# Patient Record
Sex: FEMALE | Race: WHITE | NOT HISPANIC OR LATINO | Employment: FULL TIME | URBAN - METROPOLITAN AREA
[De-identification: names, ages, dates, MRNs, and addresses within clinical notes are randomized per-mention and may not be internally consistent; named-entity substitution may affect disease eponyms.]

---

## 2017-04-09 ENCOUNTER — OFFICE VISIT (OUTPATIENT)
Dept: URGENT CARE | Facility: URGENT CARE | Age: 52
End: 2017-04-09
Payer: COMMERCIAL

## 2017-04-09 VITALS
BODY MASS INDEX: 30.02 KG/M2 | HEIGHT: 72 IN | WEIGHT: 221.6 LBS | OXYGEN SATURATION: 97 % | TEMPERATURE: 98 F | DIASTOLIC BLOOD PRESSURE: 80 MMHG | SYSTOLIC BLOOD PRESSURE: 100 MMHG | HEART RATE: 70 BPM

## 2017-04-09 DIAGNOSIS — R05.8 PRODUCTIVE COUGH: ICD-10-CM

## 2017-04-09 DIAGNOSIS — J34.89 PURULENT NASAL DISCHARGE: Primary | ICD-10-CM

## 2017-04-09 DIAGNOSIS — R09.89 CHEST CONGESTION: ICD-10-CM

## 2017-04-09 PROCEDURE — 99214 OFFICE O/P EST MOD 30 MIN: CPT | Performed by: PHYSICIAN ASSISTANT

## 2017-04-09 RX ORDER — AZITHROMYCIN 250 MG/1
TABLET, FILM COATED ORAL
Qty: 6 TABLET | Refills: 0 | Status: SHIPPED | OUTPATIENT
Start: 2017-04-09 | End: 2017-04-25

## 2017-04-09 NOTE — PROGRESS NOTES
SUBJECTIVE:   Christiana Hill is a 51 year old female presenting with a chief complaint of nasal congestion, rhinorrhea, cough  and facial pain/pressure.  Onset of symptoms was 8 day(s) ago.  Course of illness is worsening.    Severity moderate  Current and Associated symptoms: chest congestion, productive cough, sinus congestion  Treatment measures tried include OTC meds.  Predisposing factors include recent illness.    Past Medical History:   Diagnosis Date     Cancer (H) 2007    colon     History of colon cancer, no staging 9/20/2013     History of sleep apnea 9/20/2013     ALPHONSO (obstructive sleep apnea) 9/20/2013     Status post tonsillectomy 9/20/2013     Status post uvulopalatopharyngoplasty 9/20/2013       ALLERGIES   No Known Allergies      Social History   Substance Use Topics     Smoking status: Never Smoker     Smokeless tobacco: Never Used     Alcohol use Yes      Comment: once a week       ROS:  CONSTITUTIONAL:NEGATIVE for fever, chills, change in weight  INTEGUMENTARY/SKIN: NEGATIVE for worrisome rashes, moles or lesions  ENT/MOUTH: POSITIVE for purulent nasal drainage, congestion  RESP:POSITIVE for cough-productive  CV: NEGATIVE for chest pain, palpitations or peripheral edema  GI: NEGATIVE for nausea, abdominal pain, heartburn, or change in bowel habits  MUSCULOSKELETAL: NEGATIVE for significant arthralgias or myalgia  NEURO: NEGATIVE for weakness, dizziness or paresthesias    OBJECTIVE  :/80 (BP Location: Left arm, Patient Position: Chair, Cuff Size: Adult Regular)  Pulse 70  Temp 98  F (36.7  C) (Oral)  Ht 6' (1.829 m)  Wt 221 lb 9.6 oz (100.5 kg)  SpO2 97%  BMI 30.05 kg/m2  GENERAL APPEARANCE: healthy, alert and no distress  EYES: EOMI,  PERRL, conjunctiva clear  HENT: TM's normal bilaterally, nasal turbinates erythematous, swollen, rhinorrhea purulent, frontal sinus tenderness  and maxillary sinus tenderness   NECK: supple, nontender, no lymphadenopathy  RESP: lungs clear to auscultation  - no rales, rhonchi or wheezes  CV: regular rates and rhythm, normal S1 S2, no murmur noted  NEURO: Normal strength and tone, sensory exam grossly normal,  normal speech and mentation  SKIN: no suspicious lesions or rashes    ASSESSMENT/PLAN:      ICD-10-CM    1. Purulent nasal discharge J34.89 azithromycin (ZITHROMAX) 250 MG tablet   2. Productive cough R05 azithromycin (ZITHROMAX) 250 MG tablet   3. Chest congestion R09.89 azithromycin (ZITHROMAX) 250 MG tablet       Follow up as needed

## 2017-04-09 NOTE — NURSING NOTE
Chief Complaint   Patient presents with     URI     body aches, cough, chills, sinus congetsion  8x days        Initial /80 (BP Location: Left arm, Patient Position: Chair, Cuff Size: Adult Regular)  Pulse 70  Temp 98  F (36.7  C) (Oral)  Ht 6' (1.829 m)  Wt 221 lb 9.6 oz (100.5 kg)  SpO2 97%  BMI 30.05 kg/m2 Estimated body mass index is 30.05 kg/(m^2) as calculated from the following:    Height as of this encounter: 6' (1.829 m).    Weight as of this encounter: 221 lb 9.6 oz (100.5 kg).  Medication Reconciliation: complete

## 2017-04-09 NOTE — MR AVS SNAPSHOT
"              After Visit Summary   2017    Christiana Hill    MRN: [id number]           Patient Information     Date Of Birth          1965        Visit Information        Provider Department      2017 10:00 AM Nestor Metz PA-C [facility]        Today's Diagnoses     Purulent nasal discharge    -  1    Productive cough        Chest congestion           Follow-ups after your visit        Who to contact     If you have questions or need follow up information about today's clinic visit or your schedule please contact [facility] directly at [phone].  Normal or non-critical lab and imaging results will be communicated to you by MyChart, letter or phone within 4 business days after the clinic has received the results. If you do not hear from us within 7 days, please contact the clinic through [employer] or phone. If you have a critical or abnormal lab result, we will notify you by phone as soon as possible.  Submit refill requests through [employer] or call your pharmacy and they will forward the refill request to us. Please allow 3 business days for your refill to be completed.          Additional Information About Your Visit        MyChart Information     [employer] lets you send messages to your doctor, view your test results, renew your prescriptions, schedule appointments and more. To sign up, go to [email or web address] . Click on \"Log in\" on the left side of the screen, which will take you to the Welcome page. Then click on \"Sign up Now\" on the right side of the page.     You will be asked to enter the access code listed below, as well as some personal information. Please follow the directions to create your username and password.     Your access code is: [email or web address]  Expires: 2017 10:50 AM     Your access code will  in 90 days. If you need help or a new code, please call your [facility] or [phone].        Care EveryWhere " ID     This is your Care EveryWhere ID. This could be used by other organizations to access your [facility] medical records  [id number]        Your Vitals Were     Pulse Temperature Height Pulse Oximetry BMI (Body Mass Index)       70 98  F (36.7  C) (Oral) 6' (1.829 m) 97% 30.05 kg/m2        Blood Pressure from Last 3 Encounters:   04/09/17 100/80   12/19/14 124/84   03/12/14 112/68    Weight from Last 3 Encounters:   04/09/17 221 lb 9.6 oz (100.5 kg)   12/19/14 214 lb (97.1 kg)   03/12/14 212 lb (96.2 kg)              Today, you had the following     No orders found for display         Today's Medication Changes          These changes are accurate as of: 4/9/17 10:50 AM.  If you have any questions, ask your nurse or doctor.               Start taking these medicines.        Dose/Directions    azithromycin 250 MG tablet   Commonly known as:  ZITHROMAX   Used for:  Purulent nasal discharge, Productive cough, Chest congestion   Started by:  Nestor Metz PA-C        2 tabs po qd day 1, then 1 tab po qd days 2-5   Quantity:  6 tablet   Refills:  0            Where to get your medicines      These medications were sent to [facility] [id number] [place] [address] [address] AT [facility]  [address], [facility] [address]     Phone:  [phone]     azithromycin 250 MG tablet                Primary Care Provider Office Phone # Fax #    Mara Rachel Rico PA-C [phone]       [address] [address]        Thank you!     Thank you for choosing [facility]  for your care. Our goal is always to provide you with excellent care. Hearing back from our patients is one way we can continue to improve our services. Please take a few minutes to complete the written survey that you may receive in the mail after your visit with us. Thank you!             Your Updated Medication List -  Protect others around you: Learn how to safely use, store and throw away your medicines at www.disposemymeds.org.          This list is accurate as of: 4/9/17 10:50 AM.  Always use your most recent med list.                   Brand Name Dispense Instructions for use    azithromycin 250 MG tablet    ZITHROMAX    6 tablet    2 tabs po qd day 1, then 1 tab po qd days 2-5       buPROPion 75 MG tablet    WELLBUTRIN    90 tablet    Take 1 tab daily       CALCIUM PO          CLARITIN PO          FISH OIL PO          ONE-A-DAY MENOPAUSE HEALTH PO

## 2017-04-24 ENCOUNTER — HOSPITAL ENCOUNTER (EMERGENCY)
Facility: CLINIC | Age: 52
Discharge: PSYCHIATRIC OR SUBSTANCE USE FACILITY | End: 2017-04-25
Attending: EMERGENCY MEDICINE | Admitting: EMERGENCY MEDICINE
Payer: COMMERCIAL

## 2017-04-24 DIAGNOSIS — F31.9 BIPOLAR DISORDER WITH PSYCHOTIC FEATURES (H): ICD-10-CM

## 2017-04-24 PROCEDURE — 99285 EMERGENCY DEPT VISIT HI MDM: CPT

## 2017-04-25 ENCOUNTER — HOSPITAL ENCOUNTER (INPATIENT)
Facility: CLINIC | Age: 52
LOS: 2 days | Discharge: HOME | DRG: 885 | End: 2017-04-27
Attending: PSYCHIATRY & NEUROLOGY | Admitting: PSYCHIATRY & NEUROLOGY
Payer: COMMERCIAL

## 2017-04-25 VITALS
DIASTOLIC BLOOD PRESSURE: 87 MMHG | HEART RATE: 86 BPM | HEIGHT: 72 IN | WEIGHT: 230 LBS | SYSTOLIC BLOOD PRESSURE: 132 MMHG | OXYGEN SATURATION: 100 % | TEMPERATURE: 98.7 F | RESPIRATION RATE: 16 BRPM | BODY MASS INDEX: 31.15 KG/M2

## 2017-04-25 DIAGNOSIS — F31.9 BIPOLAR I DISORDER (H): Primary | ICD-10-CM

## 2017-04-25 DIAGNOSIS — F51.01 PRIMARY INSOMNIA: ICD-10-CM

## 2017-04-25 PROBLEM — F30.9 MANIA (H): Status: ACTIVE | Noted: 2017-04-25

## 2017-04-25 LAB
ALBUMIN UR-MCNC: NEGATIVE MG/DL
AMPHETAMINES UR QL SCN: NORMAL
ANION GAP SERPL CALCULATED.3IONS-SCNC: 10 MMOL/L (ref 3–14)
APPEARANCE UR: CLEAR
BACTERIA #/AREA URNS HPF: ABNORMAL /HPF
BARBITURATES UR QL: NORMAL
BASOPHILS # BLD AUTO: 0 10E9/L (ref 0–0.2)
BASOPHILS NFR BLD AUTO: 0.3 %
BENZODIAZ UR QL: NORMAL
BILIRUB UR QL STRIP: NEGATIVE
BUN SERPL-MCNC: 11 MG/DL (ref 7–30)
CALCIUM SERPL-MCNC: 8.8 MG/DL (ref 8.5–10.1)
CANNABINOIDS UR QL SCN: NORMAL
CHLORIDE SERPL-SCNC: 106 MMOL/L (ref 94–109)
CO2 SERPL-SCNC: 23 MMOL/L (ref 20–32)
COCAINE UR QL: NORMAL
COLOR UR AUTO: ABNORMAL
CREAT SERPL-MCNC: 0.97 MG/DL (ref 0.52–1.04)
DIFFERENTIAL METHOD BLD: NORMAL
EOSINOPHIL # BLD AUTO: 0.1 10E9/L (ref 0–0.7)
EOSINOPHIL NFR BLD AUTO: 0.6 %
ERYTHROCYTE [DISTWIDTH] IN BLOOD BY AUTOMATED COUNT: 13.5 % (ref 10–15)
GFR SERPL CREATININE-BSD FRML MDRD: 60 ML/MIN/1.7M2
GLUCOSE SERPL-MCNC: 122 MG/DL (ref 70–99)
GLUCOSE UR STRIP-MCNC: NEGATIVE MG/DL
HCT VFR BLD AUTO: 39.9 % (ref 35–47)
HGB BLD-MCNC: 13.6 G/DL (ref 11.7–15.7)
HGB UR QL STRIP: NEGATIVE
IMM GRANULOCYTES # BLD: 0 10E9/L (ref 0–0.4)
IMM GRANULOCYTES NFR BLD: 0.1 %
KETONES UR STRIP-MCNC: 10 MG/DL
LEUKOCYTE ESTERASE UR QL STRIP: NEGATIVE
LYMPHOCYTES # BLD AUTO: 2.3 10E9/L (ref 0.8–5.3)
LYMPHOCYTES NFR BLD AUTO: 26.7 %
MCH RBC QN AUTO: 31.1 PG (ref 26.5–33)
MCHC RBC AUTO-ENTMCNC: 34.1 G/DL (ref 31.5–36.5)
MCV RBC AUTO: 91 FL (ref 78–100)
MONOCYTES # BLD AUTO: 0.9 10E9/L (ref 0–1.3)
MONOCYTES NFR BLD AUTO: 9.8 %
NEUTROPHILS # BLD AUTO: 5.4 10E9/L (ref 1.6–8.3)
NEUTROPHILS NFR BLD AUTO: 62.5 %
NITRATE UR QL: NEGATIVE
OPIATES UR QL SCN: NORMAL
PCP UR QL SCN: NORMAL
PH UR STRIP: 7 PH (ref 5–7)
PLATELET # BLD AUTO: 313 10E9/L (ref 150–450)
POTASSIUM SERPL-SCNC: 3.5 MMOL/L (ref 3.4–5.3)
RBC # BLD AUTO: 4.38 10E12/L (ref 3.8–5.2)
RBC #/AREA URNS AUTO: 1 /HPF (ref 0–2)
SODIUM SERPL-SCNC: 139 MMOL/L (ref 133–144)
SP GR UR STRIP: 1.01 (ref 1–1.03)
SQUAMOUS #/AREA URNS AUTO: <1 /HPF (ref 0–1)
TSH SERPL DL<=0.05 MIU/L-ACNC: 2.18 MU/L (ref 0.4–4)
URN SPEC COLLECT METH UR: ABNORMAL
UROBILINOGEN UR STRIP-MCNC: NORMAL MG/DL (ref 0–2)
WBC # BLD AUTO: 8.7 10E9/L (ref 4–11)
WBC #/AREA URNS AUTO: 1 /HPF (ref 0–2)

## 2017-04-25 PROCEDURE — 81001 URINALYSIS AUTO W/SCOPE: CPT | Performed by: EMERGENCY MEDICINE

## 2017-04-25 PROCEDURE — 12400001 ZZH R&B MH UMMC

## 2017-04-25 PROCEDURE — 84443 ASSAY THYROID STIM HORMONE: CPT | Performed by: EMERGENCY MEDICINE

## 2017-04-25 PROCEDURE — 80048 BASIC METABOLIC PNL TOTAL CA: CPT | Performed by: EMERGENCY MEDICINE

## 2017-04-25 PROCEDURE — 25000132 ZZH RX MED GY IP 250 OP 250 PS 637: Performed by: PSYCHIATRY & NEUROLOGY

## 2017-04-25 PROCEDURE — 85025 COMPLETE CBC W/AUTO DIFF WBC: CPT | Performed by: EMERGENCY MEDICINE

## 2017-04-25 PROCEDURE — 80307 DRUG TEST PRSMV CHEM ANLYZR: CPT | Performed by: EMERGENCY MEDICINE

## 2017-04-25 RX ORDER — BISACODYL 10 MG
10 SUPPOSITORY, RECTAL RECTAL DAILY PRN
Status: DISCONTINUED | OUTPATIENT
Start: 2017-04-25 | End: 2017-04-27 | Stop reason: HOSPADM

## 2017-04-25 RX ORDER — OLANZAPINE 10 MG/2ML
10 INJECTION, POWDER, FOR SOLUTION INTRAMUSCULAR
Status: DISCONTINUED | OUTPATIENT
Start: 2017-04-25 | End: 2017-04-27 | Stop reason: HOSPADM

## 2017-04-25 RX ORDER — OLANZAPINE 10 MG/1
10 TABLET ORAL
Status: DISCONTINUED | OUTPATIENT
Start: 2017-04-25 | End: 2017-04-27 | Stop reason: HOSPADM

## 2017-04-25 RX ORDER — TRAZODONE HYDROCHLORIDE 50 MG/1
50 TABLET, FILM COATED ORAL
Status: DISCONTINUED | OUTPATIENT
Start: 2017-04-25 | End: 2017-04-27 | Stop reason: HOSPADM

## 2017-04-25 RX ORDER — ALUMINA, MAGNESIA, AND SIMETHICONE 2400; 2400; 240 MG/30ML; MG/30ML; MG/30ML
30 SUSPENSION ORAL EVERY 4 HOURS PRN
Status: DISCONTINUED | OUTPATIENT
Start: 2017-04-25 | End: 2017-04-27 | Stop reason: HOSPADM

## 2017-04-25 RX ORDER — HYDROXYZINE HYDROCHLORIDE 25 MG/1
25-50 TABLET, FILM COATED ORAL EVERY 4 HOURS PRN
Status: DISCONTINUED | OUTPATIENT
Start: 2017-04-25 | End: 2017-04-27 | Stop reason: HOSPADM

## 2017-04-25 RX ORDER — ACETAMINOPHEN 325 MG/1
650 TABLET ORAL EVERY 4 HOURS PRN
Status: DISCONTINUED | OUTPATIENT
Start: 2017-04-25 | End: 2017-04-27 | Stop reason: HOSPADM

## 2017-04-25 RX ADMIN — TRAZODONE HYDROCHLORIDE 50 MG: 50 TABLET ORAL at 20:50

## 2017-04-25 RX ADMIN — OLANZAPINE 10 MG: 10 TABLET, FILM COATED ORAL at 19:46

## 2017-04-25 ASSESSMENT — ACTIVITIES OF DAILY LIVING (ADL)
DRESS: STREET CLOTHES;INDEPENDENT
TOILETING: 0-->INDEPENDENT
TRANSFERRING: 0-->INDEPENDENT
AMBULATION: 0-->INDEPENDENT
BATHING: 0-->INDEPENDENT
RETIRED_EATING: 0-->INDEPENDENT
DRESS: 0-->INDEPENDENT
SWALLOWING: 0-->SWALLOWS FOODS/LIQUIDS WITHOUT DIFFICULTY
RETIRED_COMMUNICATION: 0-->UNDERSTANDS/COMMUNICATES WITHOUT DIFFICULTY
GROOMING: HANDWASHING;INDEPENDENT
FALL_HISTORY_WITHIN_LAST_SIX_MONTHS: NO
COGNITION: 0 - NO COGNITION ISSUES REPORTED

## 2017-04-25 NOTE — PROGRESS NOTES
04/25/17 1212   Patient Belongings   Patient Belongings other (see comments)     Locker:  Earrings    Belongings brought for Pt on 4/26/17 @7pm-   2 pair of under wear, 2 pairs of bra, 2 pairs of T-shirt, 1 pair of night gown,  1 pair of 3/4-trouser (black),  3 pairs of socks,  1 pair of short pant with string,1 used colgate toothpaste, 1 tub of White& Clean, 1 Olay Night cream, 1 bottle of Olay sunscreen lotion, 1 degree Ultraclear antiperspirant, 1 hair guide,  1 Purple bag,  and 1 pair of shoes  **nothing sent to security**    ADMISSION:  I am responsible for any personal items that are not sent to the safe or pharmacy. [facility] is not responsible for loss, theft or damage of any property in my possession.  Patient Signature _____________________ Date/Time _____________________  Staff Signature _______________________ Date/Time _____________________  2nd Staff person, if patient is unable/unwilling to sign  ___________________________________ Date/Time _____________________  DISCHARGE:  All personal items have been returned to me.  Patient Signature _____________________ Date/Time _____________________  Staff Signature _______________________ Date/Time _____________________

## 2017-04-25 NOTE — ED NOTES
Patient given copy of 72 hour hold and patient rights.  Patient signed EMTALA.   notified of transfer via phone and given number to admitting unit.

## 2017-04-25 NOTE — IP AVS SNAPSHOT
30    [address]    [place] [address]    Phone:  [phone]                                       After Visit Summary   4/25/2017    Christiana Hill    MRN: [id number]           After Visit Summary Signature Page     I have received my discharge instructions, and my questions have been answered. I have discussed any challenges I see with this plan with the nurse or doctor.    ..........................................................................................................................................  Patient/Patient Representative Signature      ..........................................................................................................................................  Patient Representative Print Name and Relationship to Patient    ..................................................               ................................................  Date                                            Time    ..........................................................................................................................................  Reviewed by Signature/Title    ...................................................              ..............................................  Date                                                            Time

## 2017-04-25 NOTE — ED NOTES
Patient calm, cooperative and very pleasant. Sitting quietly in room. RN brought puzzle which patient is working on.

## 2017-04-25 NOTE — ED NOTES
Call light answered. Patient requesting to take cell phone out of the room so that she could sleep. ERT complied. Cell phone is out of the room and will be returned to the patients  when he returns in the morning.

## 2017-04-25 NOTE — ED PROVIDER NOTES
Assumed care at change of shift - patient accepted for admission at [facility].     Jacky Brar MD  04/25/17 7583

## 2017-04-25 NOTE — IP AVS SNAPSHOT
MRN:[id number]                      After Visit Summary   4/25/2017    Christiana Hill    MRN: [id number]           Thank you!     Thank you for choosing [facility] for your care. Our goal is always to provide you with excellent care.        Patient Information     Date Of Birth          1965        Designated Caregiver       Most Recent Value    Caregiver    Will someone help with your care after discharge? yes    Name of designated caregiver  Gwyn    Phone number of caregiver [phone]    Caregiver address [place]      About your hospital stay     You were admitted on:  April 25, 2017 You last received care in the:  UR 30NR    You were discharged on:  April 27, 2017       Who to Call     For medical emergencies, please call 911.  For non-urgent questions about your medical care, please call your primary care provider or clinic, [phone]          Attending Provider     Provider Celso Shipman MD Psychiatry       Primary Care Provider Office Phone # Fax #    Mara Rico PA-C [phone]       [address] [address]        Your next 10 appointments already scheduled     May 12, 2017  9:00 AM CDT   Office Visit with Mara Rico PA-C   [facility] ([facility])    [address] [place] [address] [phone]           Bring a current list of meds and any records pertaining to this visit.  For Physicals, please bring immunization records and any forms needing to be filled out.  Please arrive 10 minutes early to complete paperwork.              Further instructions from your care team           Behavioral Discharge Planning and Instructions      Summary:  You were admitted on 4/25/2017  for symptoms of roderick.  You were treated by Dr. Celso Bolivar MD. You agreed that you have Bipolar Disorder and  agreed that you should take  medications. You were discharged on 4/27/2017 from [facility].    Main Diagnosis:   Bipolar disorder type 1, most recent episode manic (provisional).     Health Care Follow-up Appointments:     Attend your new patient therapy appointment. Tuesday, May 9, 2017 at 3PM  Aspen Almanza  [facility]  [address]  [address]  [place] [address]  Phone: [phone]  The Health Unit Coordinator has faxed these discharge instructions to fax: [phone]      Attend your new patient psychiatric medication management appointment. Wednesday, June 14, 2017 at 9AM  Li Cook   [facility]  [address]  [address]  [place] [address]  Phone: [phone]  The Health Unit Coordinator has faxed these discharge instructions to fax: [phone]      Attend your primary care appointment. Have this provided order the refills for your psychiatric medication.  May 12, 2017  9:00 AM CDT   Office Visit with Mara Rico PA-C   [facility] ([facility]    [address] [place] [address] [phone]            Attend all scheduled appointments with your outpatient providers. Call at least 24 hours in advance if you need to reschedule an appointment to ensure continued access to your outpatient providers.   Major Treatments, Procedures and Findings:  You were provided with: a psychiatric assessment, medication evaluation and/or management and group therapy    Symptoms to Report: feeling more aggressive or Uatsdin speech out of context    Early warning signs can include: overspending    Safety and Wellness:  Take all medicines as directed.  Make no changes unless your doctor suggests them.      Follow treatment recommendations.  Refrain from alcohol and non-prescribed drugs.  If there is a concern for safety, call 911.    Resources:   COPE (Community Outreach for Psychiatric Emergencies): [phone].  Fax:  [phone]  Available 24-hours a day, 7 days a week. Call a COPE professional when a severe disturbance of mood or thinking is impacting your safety. COPE professionals are available to go where you are, handle an immediate crisis, and provide a clinical assessment. If needed, COPE will arrange an emergency evaluation for inpatient psychiatric services. Telephone consultations are also available. Ask them if you meet criteria for a crisis residence.     You can also talk to COPE about crisis stabilization services if you are experiencing difficulty with the transition from the hospital.      DBSA  Depression and Bipolar Support [place]   www.dbaalliance.org    We recommend you participate in DBSA groups (Depression & Bipolar Support [place]).  There is no charge for these support / education groups.    Warmline  If you are not in a crisis, but would like to receive support or information from a peer, contact the Warmline at [phone] ([facility] area) or [phone].    [facility] Acute Psychiatric Services  Acute Psychiatric Services/Crisis Intervention: [phone]  24-hour walk-in crisis intervention and treatment of behavioral emergencies    Located at:  o 0 [facility] -  in the Emergency room on the first floor of the [facility]  [address] [place] [address]  Phone: [phone]  This is a crisis residence where you can stay for a short time if you feel like you need to stabilize but do not need to go to the hospital.      Crisis Connection 24/7 Community Call Center: [phone]  Phone: [phone] outside the [facility] area: [phone]  www.crisis.org   Hours: 24 hours/day, 7 days/week  Services: Free and confidential telephone counseling provided by trained volunteers  supervised by professional staff. Early intervention and brief supportive counseling for  callers with issues of depression, stress and anxiety, domestic  "violence, parent/child  conflicts, family issues, loneliness, grief and loss, suicidal ideation and chronic mental  Illness.      [employer] [place] [facility]  You and your family would benefit from the support groups at [employer] [facility] [employer] [facility]- [place] Chapter.   Www.namihelps.org            The treatment team has appreciated the opportunity to work with you.     If you have any questions or concerns our unit number is [phone]  You may be receiving a follow-up phone call within the next three days from a representative from behavioral health.    You have identified the best phone number to reach you as [phone]          Pending Results     No orders found from 2017 to 2017.            Admission Information     Date & Time Provider Department Dept. Phone    2017 Celso Bolivar MD UR 30NR [phone]      Your Vitals Were     Temperature Respirations Height Weight BMI (Body Mass Index)       96.6  F (35.9  C) (Oral) 16 1.854 m (6' 1\") 99.3 kg (219 lb) 28.89 kg/m2       [employer] Information     [employer] lets you send messages to your doctor, view your test results, renew your prescriptions, schedule appointments and more. To sign up, go to [email or web address] . Click on \"Log in\" on the left side of the screen, which will take you to the Welcome page. Then click on \"Sign up Now\" on the right side of the page.     You will be asked to enter the access code listed below, as well as some personal information. Please follow the directions to create your username and password.     Your access code is: [email or web address]  Expires: 2017 10:50 AM     Your access code will  in 90 days. If you need help or a new code, please call your [facility] or [phone].        Care EveryWhere ID     This is your Care EveryWhere ID. This could be used by other organizations to access your [facility] medical records  [id number]           Review of your medicines      START " taking        Dose / Directions    ARIPiprazole 10 MG tablet   Commonly known as:  ABILIFY   Used for:  Bipolar I disorder (H)        Dose:  10 mg   Take 1 tablet (10 mg) by mouth At Bedtime   Quantity:  30 tablet   Refills:  0       traZODone 50 MG tablet   Commonly known as:  DESYREL   Used for:  Primary insomnia        Dose:  50 mg   Take 1 tablet (50 mg) by mouth nightly as needed for sleep   Quantity:  20 tablet   Refills:  0         CONTINUE these medicines which have NOT CHANGED        Dose / Directions    CALCIUM PO        Dose:  2 capsule   Take 2 capsules by mouth daily   Refills:  0       CLARITIN PO        Refills:  0       FISH OIL PO        Dose:  2 capsule   Take 2 capsules by mouth daily   Refills:  0       ONE-A-DAY MENOPAUSE HEALTH PO        Refills:  0            Where to get your medicines      These medications were sent to [facility] [place], [place] - [address]  [address] [address]     Phone:  [phone]     ARIPiprazole 10 MG tablet    traZODone 50 MG tablet                Protect others around you: Learn how to safely use, store and throw away your medicines at www.disposemymeds.org.             Medication List: This is a list of all your medications and when to take them. Check marks below indicate your daily home schedule. Keep this list as a reference.      Medications           Morning Afternoon Evening Bedtime As Needed    ARIPiprazole 10 MG tablet   Commonly known as:  ABILIFY   Take 1 tablet (10 mg) by mouth At Bedtime   Last time this was given:  5 mg on 4/26/2017  9:27 PM                                CALCIUM PO   Take 2 capsules by mouth daily                                CLARITIN PO                                FISH OIL PO   Take 2 capsules by mouth daily                                ONE-A-DAY MENOPAUSE HEALTH PO                                traZODone 50 MG tablet   Commonly known as:  DESYREL   Take 1 tablet (50 mg) by  mouth nightly as needed for sleep   Last time this was given:  50 mg on 4/26/2017  9:27 PM

## 2017-04-25 NOTE — PLAN OF CARE
Problem: Manic Symptoms  Intervention: Social and Therapeutic Interv (Manic Symptoms)        Occupational Therapy:  Pt has yet to attend OT-facilitated group sessions.   Plan:  Pt will be encouraged to attend groups and be provided a self assessment form.  OT staff will explain the value of OT including pt in her treatment plan and offer options to meet her needs and identified goals.

## 2017-04-25 NOTE — ED NOTES
"Patient was escorted to the bathroom to provide UA, patient walked into  The bathroom, stated \"done\" and walked out.  "

## 2017-04-25 NOTE — ED NOTES
"Patient has no history of mental illness, familial history.  Approximately 3 weeks prior, neighbor and  noted the patient to have different behaviors, not understanding personal space, now \"out of control\" spending of money, believes that she is God.   "
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
## 2017-04-25 NOTE — ED PROVIDER NOTES
"CHIEF COMPLAINT:  Psychotic break.      HISTORY OF PRESENT ILLNESS:  The patient Christiana Hill is a very unfortunate 51-year-old woman presenting to the Emergency Department with manic behaviors and psychotic findings with grandiose ideas that she is god.  The patient's neighbor is a psychologist and has a close relationship with the Liz family.  Approximately 3 weeks ago he noticed that the patient seemed to be invading his personal space when talking which was very unusual for her.  Furthermore her speech seemed to be rather pressured, and she seemed filled with energy.  She also made statements that seemed to be rather grandiose for her as she is typically a calm and humble woman.  Over these 3 weeks this behavior has escalated.  She is now purchasing all kinds of items from the [employer] internet site.  Her  says that she is lying to him, and when he confronts her she claims, \"I am Saturnino, so I can say no wrong.\"  This is very unusual behavior for the patient as she has no history of mental health problems.  However, there is a strong family history of bipolar disorder with her mother developing similar symptoms when she was middle-aged.  The patient has no complaints for me at all.  She is in excellent spirits and tells me that all is well and that we are all safe because god is with us all as the patient (god) is in the room.  She denies paranoia or hallucinations.      PAST MEDICAL HISTORY:  The patient has no significant past medical history.  She has chronic neck discomfort which is typically managed without pain medication.      MEDICATIONS:  Wellbutrin.      ALLERGIES:  None.      SOCIAL HISTORY:  The patient does not smoke or drink alcohol.  She denies using illicit drugs, and her family concurs with this.      REVIEW OF SYSTEMS:  Pertinent positives and negatives as above, all other systems reviewed and negative.      PHYSICAL EXAMINATION:   VITAL SIGNS:  Blood pressure 143/86, heart rate 76, " "respiratory rate 16, temperature 98.7 orally and satting 100% on room air.   GENERAL:  The patient is a very happy, bright, middle-aged woman resting comfortably in the bed and showing no signs of acute distress.   HEENT:  Pupils are equal, round, reactive to light with extraocular movements intact, no rhinorrhea, moist mucous membranes.   CARDIOVASCULAR:  Regular rate and rhythm, no murmurs, gallops or rubs.   RESPIRATORY:  Lungs clear to auscultation bilaterally without wheezes, rales or rhonchi.   GASTROINTESTINAL:  Positive bowel sounds, abdomen soft, nontender, nondistended.   MUSCULOSKELETAL:  Full range of motion of all extremities without difficulty.   SKIN:  Warm and dry without rash.   NEUROLOGIC:  Nonfocal examination.   PSYCHIATRIC:  The patient clearly is manic.  She has pressured speech.  However, she is very happy and almost giddy to be here with frequent laughter.  She states, \"Everything is great.  [employer] is great.  Life is great.  I could not be happier.\"  She frequently states that she is god.      LABORATORY RESULTS:     1.  Unremarkable chemistry panel.   2.  Normal CBC.   3.  Normal TSH.     4.  A urine toxicology screen is pending.   5.  A urinalysis is pending.      EMERGENCY DEPARTMENT COURSE AND MEDICAL DECISION MAKING:  Nursing notes were reviewed and agreed with.  The patient had an IV started, and labs were obtained.  She did not require any medications while in the Emergency Department.      Ms. Hill presents to us in a clearly manic state with psychotic features.  This appears to be a new psychotic break.  There is no evidence of an organic etiology.  I highly doubt this represents illicit drug use.  I spoke with Central Intake personally.  Unfortunately there are no beds available in the [facility].  Therefore the patient will be placed on Boarding Status.  She was placed on a Health Officer hold and will have a 72-hour hold placed once a bed is arranged.  The patient is " here voluntarily.  However, she has very poor insight into her medical and psychiatric issues and does not believe that she is actually ill.  Nonetheless she is very happy to stay, and her family is in agreement with her being admitted.  The patient will be signed out to the incoming Emergency Department Physician pending final disposition.      DIAGNOSIS:  Bipolar disorder with psychotic features, new onset.         CHAD A. TRIERWEILER, MD             D: 2017 03:37   T: 2017 08:47   MT: ROSAUAR#[id number]      Name:     KIM RAWLS   MRN:      [id number]        Account:      [id number]   :      1965           Visit Date:   2017      Document: [id number]       cc: Mara Rico PA-C

## 2017-04-25 NOTE — ED NOTES
Call light answered. Patient asking if we could provide her with a sleep number bed due to her sore hips. Patient pleasant to deal with at this time .

## 2017-04-26 PROCEDURE — 90853 GROUP PSYCHOTHERAPY: CPT

## 2017-04-26 PROCEDURE — 25000132 ZZH RX MED GY IP 250 OP 250 PS 637: Performed by: PSYCHIATRY & NEUROLOGY

## 2017-04-26 PROCEDURE — 12400001 ZZH R&B MH UMMC

## 2017-04-26 PROCEDURE — 99222 1ST HOSP IP/OBS MODERATE 55: CPT | Mod: AI | Performed by: PSYCHIATRY & NEUROLOGY

## 2017-04-26 RX ORDER — ARIPIPRAZOLE 5 MG/1
5 TABLET ORAL AT BEDTIME
Status: DISCONTINUED | OUTPATIENT
Start: 2017-04-26 | End: 2017-04-27 | Stop reason: HOSPADM

## 2017-04-26 RX ADMIN — TRAZODONE HYDROCHLORIDE 50 MG: 50 TABLET ORAL at 21:27

## 2017-04-26 RX ADMIN — ARIPIPRAZOLE 5 MG: 5 TABLET ORAL at 21:27

## 2017-04-26 ASSESSMENT — ACTIVITIES OF DAILY LIVING (ADL)
LAUNDRY: WITH SUPERVISION
DRESS: SCRUBS (BEHAVIORAL HEALTH)
ORAL_HYGIENE: INDEPENDENT
LAUNDRY: WITH SUPERVISION
DRESS: INDEPENDENT
HYGIENE/GROOMING: INDEPENDENT
GROOMING: INDEPENDENT
ORAL_HYGIENE: INDEPENDENT

## 2017-04-26 NOTE — PROGRESS NOTES
Initial Psychosocial Assessment    Information for assessment was obtained from:  I have reviewed the chart, met with the patient,     SRIRAM   Pt signed SRIRAM for  - Gwyn Byrd -   Neighbor - Wilian Onofre - [phone] - pt does not want to sign this,  wanted this signed      Presenting Problem:    This 41 year old female was brought to the [facility] ER on 17 by  and neighbor with roderick and psychosis with grandiose delusions. She has been spending excessively. This is a new onset of mental health disorder.     On 17 pt was transferred to [facility] via ambulance on a 72 hour hold.    Drug screen is negative.    There are guns in her home basement.    History of Mental Health and Chemical Dependency:    Pt s mental health symptoms only started a month ago. At first she was argumentative and then has become nonsensical.    Hospitalizations:   17 to present - [facility] [address] this is pt s first psychiatric hospitalization     Outpatient treatment  Has been in marriage therapy     Prior use of Psychotropic Medications:   Prescribed Wellbutrin by PCP    Substance abuse history  None noted    Family Description (Constellation, Family Psychiatric History):    Pt was born and raised in [place] [facility] [place] [facility]. Parents remained . Father  in . Growing up was stressful due to father s perfectionism. Pt has one older brother.    The pts mother had a psychotic break later in life. She reports her mother had some bad effects from taking lithium for BPAD.    Pt is  for the second time for 4 years. She does not have children.  has 3 adult children from a previous marriage.  is a .    Significant Life Events (Illness, Abuse, Trauma, Death):  Pt had cancer 15 years ago and is in remission.  Pt had a child  at the age of 9 months. He would be 17 years now.    Living Situation:  [place] [facility] [place] .    Educational  Background:  Pt has a Master s Degree.    Occupational History:  The pt is a school psychologist.    Financial Status:  Income:  employment wages  Insurance:  [employer] [place] [employer]age - her plan    Legal Issues:  None noted    Ethnic/Cultural Issues:  The patient does not identify any issues that impact treatment.    Spiritual Orientation:  The pt does not have a spiritual practice.     Service History:  None    Social Functioning (organization, interests):  Pt is a member of the [employer].    Current Treatment Providers are:    Marriage therapist  Dr. Jason Kearns in [facility] Assessment/Plan:  This is a new late in life onset of Bipolar DO.  Pt has her basic needs met.  Pt will need psychiatric care for the management of Bipolar DO.  Upon interview no psychosis is evident. Thoughts are linear and logical. Pt did have an explanation as to why she spoke in religous terms. Pt did take some zyprexa since she was placed on a hold.  Pt would like a therapist and psychiatrist. Pt will most likely return home tomorrow.

## 2017-04-26 NOTE — PLAN OF CARE
Problem: General Plan of Care (Inpatient Behavioral)  Goal: Team Discussion  Team Plan:   Outcome: No Change  BEHAVIORAL TEAM DISCUSSION     Continued Stay Criteria/Rationale:   Pt has severe symptoms of roderick with nonsensical speech and unable to function at home.     Plan:   Multidisciplinary evaluation  Medication management  Secure no access to the guns at home  Involve  in pt's care and discharge planning   refer to psychiatry for outpatient care     Participants:   Dr. Bolivar, Tatyana Quiñonez [facility] and Ruben Alex RN     Summary/Recommendation:   This 41 year old female was brought to the [facility] ER on 4/24/17 by  and neighbor with roderick and psychosis with grandiose delusions. She has been spending excessively. This is a new onset of mental health disorder.      On 4/25/17 pt was transferred to [facility] via ambulance on a 72 hour hold.     Drug screen is negative.     There are guns in her home basement.        Medical/Physical:   In remission from cancer        Progress:   Initial review
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
## 2017-04-26 NOTE — PLAN OF CARE
"Problem: Manic Symptoms  Goal: Manic Symptoms  Signs and symptoms of listed problems will be absent or manageable.   Outcome: No Change  Pt has been pleasant, very bright and cooperative this shift. She admits to some roderick which she correlates with menopause. Apparently this happened with pt's own mother. Pt denied ever being on estrogen or other hormonal replacement therapy for menopause. At the start of shift pt was bargaining for DC, and  was at entrance to possibly take her home. She signed an SRIRAM for him and both pt and spouse agreed that pt would stay here after some basic teaching and necessity of inpatient hospitalization was done.     Apparently this is pt's first psychiatric inpatient hospitalization. She is tangential and rambling during conversation, and could not answer many questions due to her manic state. She had some loose associations as she switched from topic to topic. She admits to recently buying multiple shoes, multiple oil filters and \"bulking up on stuff cause I don't like to shop.\" Her  reported that she bought a $900 smart phone. She did, however, request PRN as she felt that \"now is performance time- jazz hands!\" Pt claimed that she is part of an [employer] group and that she missed her recital tonight. She had an energized facial expression when talking about her leadership of the singing group. She also demonstrated a \"seven minute\" workout routine that she does on a daily basis. She expressed fear of needing to start Lithium, as her mother \"has been on it for years and now has brain damage.\" She participated well in group and is done with her intake packet. Will continue to monitor.      "

## 2017-04-26 NOTE — PROGRESS NOTES
Behavioral Health  Note   Behavioral Health  Spirituality Group Note     Unit 30    Name: Christiana Hill    YOB: 1965   MRN: [id number]    Age: 51 year old     Patient attended -led group, which included discussion of spirituality, coping with illness and building resilience.   Patient attended group for 0.5 hrs.   patient minimally participated, but was respectful to the group process.     [facility]  Staff    Page [phone]

## 2017-04-27 VITALS — HEIGHT: 72 IN | TEMPERATURE: 96.6 F | RESPIRATION RATE: 16 BRPM | WEIGHT: 219 LBS | BODY MASS INDEX: 29.66 KG/M2

## 2017-04-27 PROCEDURE — 90853 GROUP PSYCHOTHERAPY: CPT

## 2017-04-27 PROCEDURE — 99239 HOSP IP/OBS DSCHRG MGMT >30: CPT | Performed by: PSYCHIATRY & NEUROLOGY

## 2017-04-27 PROCEDURE — 25000132 ZZH RX MED GY IP 250 OP 250 PS 637: Performed by: PSYCHIATRY & NEUROLOGY

## 2017-04-27 PROCEDURE — 97150 GROUP THERAPEUTIC PROCEDURES: CPT | Mod: GO

## 2017-04-27 RX ORDER — TRAZODONE HYDROCHLORIDE 50 MG/1
50 TABLET, FILM COATED ORAL
Qty: 20 TABLET | Refills: 0 | Status: SHIPPED | OUTPATIENT
Start: 2017-04-27 | End: 2017-05-12

## 2017-04-27 RX ORDER — ARIPIPRAZOLE 10 MG/1
10 TABLET ORAL AT BEDTIME
Qty: 30 TABLET | Refills: 0 | Status: SHIPPED | OUTPATIENT
Start: 2017-04-27 | End: 2017-05-12

## 2017-04-27 RX ADMIN — ACETAMINOPHEN 650 MG: 325 TABLET, FILM COATED ORAL at 08:04

## 2017-04-27 ASSESSMENT — ACTIVITIES OF DAILY LIVING (ADL)
ORAL_HYGIENE: INDEPENDENT
DRESS: STREET CLOTHES;INDEPENDENT
GROOMING: HANDWASHING;SHOWER;INDEPENDENT
LAUNDRY: WITH SUPERVISION

## 2017-04-27 NOTE — PROGRESS NOTES
" Gwyn Hill ([phone]) (SRIRAM signed by Christiana on chart.)came to visit naz with Christiana. Visit lasted  15 to 20 minutes as they were argumentive with each other. Gwyn is upset that Christiana believes she is coming home tomorrow. He would like to speak with Dr. Bolivar prior to discharging patient. Patient has stated that \"She is staying with a friend upon discharge\" to him and is confused by this. Gwyn also finds Christiana to be \"non sensical and not any better than when she was admitted\". Gwyn Hill also requested that writer talk to a Wilian Onofre ([phone]), Wilian is a neighbor and psychologist by profession, (No SRIRAM).  Gwyn is disappointed that this writer not able to speak with Wilian Onofre as he is considered \"The behavioral health consultant to the family\" Gwyn was told that this writer would pass along his number to the physician Gwyn will be available after 10am tomorrow. One does get the impression that there is some marital discord with this couple.    "

## 2017-04-27 NOTE — PLAN OF CARE
Problem: Manic Symptoms  Intervention: Social and Therapeutic Interv (Manic Symptoms)        Occupational Therapy:  Pt. Attended a Mental Health Management Group and participated in a seated yoga session. A group discussion of exercise and the importance of relaxation followed. Pt.was cooperative and pleasant throughout session.

## 2017-04-27 NOTE — PLAN OF CARE
Problem: Manic Symptoms  Goal: Manic Symptoms  Signs and symptoms of listed problems will be absent or manageable.   Outcome: Adequate for Discharge Date Met:  04/27/17  Pt discharged @ 1455 with all belongings and 30 day supply of medications. Pt denies any suicidal thoughts. Pt is goal directed and organized. Pt appears calm. Speech normal rate and volume. Pt has been attending and participating in all groups. Pt admits to manic sx's. Pt agrees with plan to limit big decisions. Pt plans to stay with friends over the weekend. Pt was picked by friend who provided transportation. All discharge instructions were reviewed / aftercare appointments and medication instructions.     Problem: General Plan of Care (Inpatient Behavioral)  Goal: Individualization/Patient Specific Goal (IP Behavioral)  The patient and/or their representative will achieve their patient-specific goals related to the plan of care.    The patient-specific goals include:   Outcome: Adequate for Discharge Date Met:  04/27/17  Illness Management Recovery model:  Feedback.     Patient identified the following people they would like to receive feedback from if/when they see early warning signs:     Friend(s)friends, choir friends     Family(s):      Partner/Spouse: , but not feeling his support now.     Support Group Member(s):      Co-Worker(s):
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
## 2017-04-27 NOTE — DISCHARGE INSTRUCTIONS
Behavioral Discharge Planning and Instructions      Summary:  You were admitted on 4/25/2017  for symptoms of roderick.  You were treated by Dr. Celso Bolivar MD. You agreed that you have Bipolar Disorder and  agreed that you should take medications. You were discharged on 4/27/2017 from [facility].    Main Diagnosis:   Bipolar disorder type 1, most recent episode manic (provisional).     Health Care Follow-up Appointments:     Attend your new patient therapy appointment. Tuesday, May 9, 2017 at 3PM  Asp[facility] Almanza  [place] [address] [address] [address]  Phone: [phone]  The Health Unit Coordinator has faxed these discharge instructions to fax: [phone]      Attend your new patient psychiatric medication management appointment. Wednesday, June 14, 2017 at 9AM  Li Cook   [facility]  [address] [address]  Phone: [phone]  The Health Unit Coordinator has faxed these discharge instructions to fax: [phone]      Attend your primary care appointment. Have this provided order the refills for your psychiatric medication.  May 12, 2017  9:00 AM CDT   [facility]-C   [facility]    [address] [place] [id number] [phone]            Attend all scheduled appointments with your outpatient providers. Call at least 24 hours in advance if you need to reschedule an appointment to ensure continued access to your outpatient providers.   Major Treatments, Procedures and Findings:  You were provided with: a psychiatric assessment, medication evaluation and/or management and group therapy    Symptoms to Report: feeling more aggressive or [employer] speech out of context    Early warning signs can include: overspending    Safety and Wellness:  Take all medicines as directed.  Make no changes unless your doctor suggests them.       Follow treatment recommendations.  Refrain from alcohol and non-prescribed drugs.  If there is a concern for safety, call 911.    Resources:   COPE (Community Outreach for Psychiatric Emergencies): [phone].  Fax: [phone]  Available 24-hours a day, 7 days a week. Call a COPE professional when a severe disturbance of mood or thinking is impacting your safety. COPE professionals are available to go where you are, handle an immediate crisis, and provide a clinical assessment. If needed, COPE will arrange an emergency evaluation for inpatient psychiatric services. Telephone consultations are also available. Ask them if you meet criteria for a crisis residence.     You can also talk to COPE about crisis stabilization services if you are experiencing difficulty with the transition from the hospital.      [facility]  Depression and Bipolar [facility]   www.dbaalliance.org    We recommend you participate in [facility]BS[facility] groups (Depression & Bipolar Support [facility]).  There is no charge for these support / education groups.    Warmline  If you are not in a crisis, but would like to receive support or information from a peer, contact the Warmline at [phone] ([facility] area) or [phone].    [facility] Acute Psychiatric Services  Acute Psychiatric Services/Crisis Intervention: [phone]  24-hour walk-in crisis intervention and treatment of behavioral emergencies    Located at:   [address] [facility] -  in the Emergency room on the first floor of the [facility]  [facility] [place] [address]  Phone: [phone]  This is a crisis residence where you can stay for a short time if you feel like you need to stabilize but do not need to go to the hospital.      [facility]is [facility] 24/7 Community Call Center: [phone]  Phone: [phone] outside the [facility] area: [phone]  www.crisis.org   Hours: 24 hours/day, 7 days/week  Services: Free and  confidential telephone counseling provided by trained volunteers  supervised by professional staff. Early intervention and brief supportive counseling for  callers with issues of depression, stress and anxiety, domestic violence, parent/child  conflicts, family issues, loneliness, grief and loss, suicidal ideation and chronic mental  Illness.      [facility]  You and your family would benefit from the support groups at [facility].   Www.namihelps.org            The treatment team has appreciated the opportunity to work with you.     If you have any questions or concerns our unit number is [phone]  You may be receiving a follow-up phone call within the next three days from a representative from behavioral health.    You have identified the best phone number to reach you as [phone]

## 2017-04-27 NOTE — DISCHARGE SUMMARY
General acute hospital  Department of Psychiatry    DATE OF ADMISSION:  4/25/2017    DATE OF DISCHARGE:  April 27, 2017    DISCHARGE DIAGNOSES:   Bipolar disorder type 1, most recent episode manic (provisional).     HOSPITAL COURSE: (Refer to H&P, progress notes, and consult notes for details)    The patient was admitted to our Behavioral Health Unit under a 72 hour hold for reports of roderick and psychosis prompting some concern for her ability to adequately function. On initial examination, she did not meet criteria to pursue commitment for treatment of mental illness however was agreeable to continue treatment voluntarily. After reviewing her diagnosis and recommended treatment interventions, she was agreeable to start Abilify for mood stabilization and minimize reoccurrence of psychosis. She tolerated the medication well which was titrated up to 10 mg daily on the day of discharge. She had also utilized trazodone to help maintain good sleep. By my final examination with the patient, she was sleeping well, eating her meals, attending groups, appropriately social with peers and staff. She was not hostile or aggressive towards herself or others. She continued to deny suicidal and homicidal thoughts. She did not display any evidence of psychosis during her time on our unit. The patient requested to be discharged from the hospital and was willing to continue outpatient care with a community provider. Since she maintained the right to request discharge from the hospital, her request was granted and her care was transitioned to outpatient providers. She was agreeable for me to contact her  which I did on the day of discharge to review the patient's plan of care and answer corresponding questions.    Mood and anxiety-related symptoms had improved by the time of discharge and the patient denied suicidal and homicidal thoughts, plans, or intent.  Treatment team felt the patient was stable  and ready for discharge.    No restraints or seclusions were required during their hospitalization.  No allergies or severe adverse reactions to the medications were noted.    Other interventions received during his hospitalization included:   Psychosocial treatments were addressed with groups, social work consult, and supportive milieu provided by staff.    CONDITION AT DISCHARGE:  Improved.  The patients acute suicide risk is low due to the following factors:  improved mood/anxiety symptoms.  Denies suicidal ideations. Denies psychotic symptoms.  Not actively intoxicated and plans to abstain from illicit substances and alcohol.  Denies access to guns noting that guns which were previously available at her home had been secured from immediate access.  Denies feeling hopeless or helpless. At the time of discharge Christiana Hill was determined to not be an immediate danger to herself or others. The patient's acute risk will be higher if noncompliant with treatment plan, medications, follow-up or using illicit substances or alcohol.  These findings along with the risks of noncompliance with medications and treatment plan, which could potentially cause decompensation and increase the risk for suicide, were discussed with the patient.  The patients chronic suicide risk is moderate given the following factors: white race; diagnosis of Bipolar disorder,Denied a family history of suicide.  Preventative factors include: social supports, stable housing, employment     MENTAL STATUS EXAMINATION AT TIME OF DISCHARGE:  The patient is 51 year old White female who appears their stated age and is appropriately dressed with good hygiene.  Calm and cooperative with the interview questions.  No psychomotor abnormalities are noted. Eye contact is appropriate. Speech has normal rate, tone, latency and volume and is not pushed or pressured. Mood is euthymic and affect is full and appropriate.  The patient does not seem overtly depressed,  anxious, manic or irritable.  Thought process is linear, logical and future oriented.  Thought process is not tangential, circumstantial or disorganized.  Thought content is not significant for apperant paranoia, delusions, ideas of reference or grandiosity.  The patient denies suicidal and homicidal ideations as well as auditory and visual hallucinations.  Insight and judgment are fair.  Cognition appears intact to interviewing including orientation, recent and remote memory, fund of knowledge, use of language, attention span and concentration.  Muscle strength, tone and gait appear normal on visual inspection.      DISPOSITION:  The patient is discharged home     FOLLOWUP APPOINTMENTS:  ( per social workers notes and after visit summary)  1.  Psychotherapy through [facility] on May 9  2. Medication management through [facility] on June 14  3. Primary care appointment through [facility] in [place] May 12    DISCHARGE MEDICATIONS:   Current Discharge Medication List      START taking these medications    Details   ARIPiprazole (ABILIFY) 10 MG tablet Take 1 tablet (10 mg) by mouth At Bedtime  Qty: 30 tablet, Refills: 0    Associated Diagnoses: Bipolar I disorder (H)      traZODone (DESYREL) 50 MG tablet Take 1 tablet (50 mg) by mouth nightly as needed for sleep  Qty: 20 tablet, Refills: 0    Associated Diagnoses: Primary insomnia         CONTINUE these medications which have NOT CHANGED    Details   Omega-3 Fatty Acids (FISH OIL PO) Take 2 capsules by mouth daily       CALCIUM PO Take 2 capsules by mouth daily       Loratadine (CLARITIN PO)       Specialty Vitamins Products (ONE-A-DAY MENOPAUSE HEALTH PO)               LABORATORY RESULTS: (past 14 days)  Recent Results (from the past 336 hour(s))   CBC with platelets differential    Collection Time: 04/25/17 12:25 AM   Result Value Ref Range    WBC 8.7 4.0 - 11.0 10e9/L    RBC Count 4.38 3.8 - 5.2 10e12/L    Hemoglobin 13.6 11.7 -  15.7 g/dL    Hematocrit 39.9 35.0 - 47.0 %    MCV 91 78 - 100 fl    MCH 31.1 26.5 - 33.0 pg    MCHC 34.1 31.5 - 36.5 g/dL    RDW 13.5 10.0 - 15.0 %    Platelet Count 313 150 - 450 10e9/L    Diff Method Automated Method     % Neutrophils 62.5 %    % Lymphocytes 26.7 %    % Monocytes 9.8 %    % Eosinophils 0.6 %    % Basophils 0.3 %    % Immature Granulocytes 0.1 %    Absolute Neutrophil 5.4 1.6 - 8.3 10e9/L    Absolute Lymphocytes 2.3 0.8 - 5.3 10e9/L    Absolute Monocytes 0.9 0.0 - 1.3 10e9/L    Absolute Eosinophils 0.1 0.0 - 0.7 10e9/L    Absolute Basophils 0.0 0.0 - 0.2 10e9/L    Abs Immature Granulocytes 0.0 0 - 0.4 10e9/L   Basic metabolic panel    Collection Time: 04/25/17 12:25 AM   Result Value Ref Range    Sodium 139 133 - 144 mmol/L    Potassium 3.5 3.4 - 5.3 mmol/L    Chloride 106 94 - 109 mmol/L    Carbon Dioxide 23 20 - 32 mmol/L    Anion Gap 10 3 - 14 mmol/L    Glucose 122 (H) 70 - 99 mg/dL    Urea Nitrogen 11 7 - 30 mg/dL    Creatinine 0.97 0.52 - 1.04 mg/dL    GFR Estimate 60 (L) >60 mL/min/1.7m2    GFR Estimate If Black 73 >60 mL/min/1.7m2    Calcium 8.8 8.5 - 10.1 mg/dL   TSH    Collection Time: 04/25/17 12:25 AM   Result Value Ref Range    TSH 2.18 0.40 - 4.00 mU/L   UA with Microscopic    Collection Time: 04/25/17  6:03 AM   Result Value Ref Range    Color Urine Light Yellow     Appearance Urine Clear     Glucose Urine Negative NEG mg/dL    Bilirubin Urine Negative NEG    Ketones Urine 10 (A) NEG mg/dL    Specific Gravity Urine 1.006 1.003 - 1.035    Blood Urine Negative NEG    pH Urine 7.0 5.0 - 7.0 pH    Protein Albumin Urine Negative NEG mg/dL    Urobilinogen mg/dL Normal 0.0 - 2.0 mg/dL    Nitrite Urine Negative NEG    Leukocyte Esterase Urine Negative NEG    Source Midstream Urine     WBC Urine 1 0 - 2 /HPF    RBC Urine 1 0 - 2 /HPF    Bacteria Urine Moderate (A) NEG /HPF    Squamous Epithelial /HPF Urine <1 0 - 1 /HPF   Drug abuse screen urine    Collection Time: 04/25/17  6:03 AM   Result  Value Ref Range    Amphetamine Qual Urine  NEG     Negative   Cutoff for a negative amphetamine is 500 ng/mL or less.      Barbiturates Qual Urine  NEG     Negative   Cutoff for a negative barbiturate is 200 ng/mL or less.      Benzodiazepine Qual Urine  NEG     Negative   Cutoff for a negative benzodiazepine is 200 ng/mL or less.      Cannabinoids Qual Urine  NEG     Negative   Cutoff for a negative cannabinoid is 50 ng/mL or less.      Cocaine Qual Urine  NEG     Negative   Cutoff for a negative cocaine is 300 ng/mL or less.      Opiates Qualitative Urine  NEG     Negative   Cutoff for a negative opiate is 300 ng/mL or less.      PCP Qual Urine  NEG     Negative   Cutoff for a negative PCP is 25 ng/mL or less.         >30 minutes was spent on this discharge to allow for reviewing the patient's response to treatment, reviewing plan of care, education on medications and diagnosis, and conducting a risk assessment.
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
## 2017-04-28 ENCOUNTER — TELEPHONE (OUTPATIENT)
Dept: FAMILY MEDICINE | Facility: CLINIC | Age: 52
End: 2017-04-28

## 2017-04-28 NOTE — TELEPHONE ENCOUNTER
"Hospital/TCU/ED for chronic condition Discharge Protocol    \"Hi, my name is Norma BELLA,  a registered nurse, and I am calling from [facility].  I am calling to follow up and see how things are going for you after your recent emergency visit/hospital/TCU stay.\"    Tell me how you are doing now that you are home?\" \"just fine\"      Discharge Instructions    \"Let's review your discharge instructions.  What is/are the follow-up recommendations?  Pt. Response: take meds, use coping strategies    \"Has an appointment with your primary care provider been scheduled?\"   Yes. (confirm)    \"When you see the provider, I would recommend that you bring your medications with you.\"    Medications    \"Tell me what changed about your medicines when you discharged?\"    Changes to chronic meds?    No    \"What questions do you have about your medications?\"    None     New diagnoses of heart failure, COPD, diabetes, or MI?    No              Medication reconciliation completed? Yes  Was MTM referral placed (*Make sure to put transitions as reason for referral)?   No    Call Summary    \"What questions or concerns do you have about your recent visit and your follow-up care?\"     none    \"If you have questions or things don't continue to improve, we encourage you contact us through the main clinic number (give number).  Even if the clinic is not open, triage nurses are available 24/7 to help you.     We would like you to know that our clinic has extended hours (provide information).  We also have urgent care (provide details on closest location and hours/contact info)\"      \"Thank you for your time and take care!\"  Nomra Franco RN, BSN  Message handled by Nurse Triage.               "

## 2017-04-28 NOTE — TELEPHONE ENCOUNTER
ED / Discharge Outreach Protocol    Patient Contact    Attempt # 1    Was call answered?  No.  Left message on voicemail with information to call me back.  Ulisses Sunshine RN

## 2017-05-12 ENCOUNTER — OFFICE VISIT (OUTPATIENT)
Dept: FAMILY MEDICINE | Facility: CLINIC | Age: 52
End: 2017-05-12
Payer: COMMERCIAL

## 2017-05-12 VITALS
DIASTOLIC BLOOD PRESSURE: 72 MMHG | OXYGEN SATURATION: 98 % | HEART RATE: 75 BPM | TEMPERATURE: 98.5 F | RESPIRATION RATE: 16 BRPM | WEIGHT: 215 LBS | HEIGHT: 72 IN | BODY MASS INDEX: 29.12 KG/M2 | SYSTOLIC BLOOD PRESSURE: 110 MMHG

## 2017-05-12 DIAGNOSIS — N95.1 SYMPTOMATIC MENOPAUSAL OR FEMALE CLIMACTERIC STATES: ICD-10-CM

## 2017-05-12 DIAGNOSIS — F31.9 BIPOLAR 1 DISORDER (H): Primary | ICD-10-CM

## 2017-05-12 PROCEDURE — 99214 OFFICE O/P EST MOD 30 MIN: CPT | Performed by: PHYSICIAN ASSISTANT

## 2017-05-12 NOTE — MR AVS SNAPSHOT
After Visit Summary   5/12/2017    Christiana Hill    MRN: [id number]           Patient Information     Date Of Birth          1965        Visit Information        Provider Department      5/12/2017 9:00 AM Mara Rico PA-C [facility]        Today's Diagnoses     Bipolar 1 disorder (H)    -  1    Symptomatic menopausal or female climacteric states           Follow-ups after your visit        Additional Services     OB/GYN REFERRAL       Your provider has referred you to:  FMG: [facility] [phone]   [email or web address]  FHN: [facility] JOCE Rogers [phone]   [email or web address]    Please be aware that coverage of these services is subject to the terms and limitations of your health insurance plan.  Call member services at your health plan with any benefit or coverage questions.      Please bring the following with you to your appointment:    (1) Any X-Rays, CTs or MRIs which have been performed.  Contact the facility where they were done to arrange for  prior to your scheduled appointment.   (2) List of current medications   (3) This referral request   (4) Any documents/labs given to you for this referral                  Who to contact     If you have questions or need follow up information about today's clinic visit or your schedule please contact [facility] directly at [phone].  Normal or non-critical lab and imaging results will be communicated to you by MyChart, letter or phone within 4 business days after the clinic has received the results. If you do not hear from us within 7 days, please contact the clinic through MyChart or phone. If you have a critical or abnormal lab result, we will notify you by phone as soon as possible.  Submit refill requests through [employer] or call your pharmacy and they will forward the refill request to us. Please  "allow 3 business days for your refill to be completed.          Additional Information About Your Visit        MyChart Information     Oculevehart lets you send messages to your doctor, view your test results, renew your prescriptions, schedule appointments and more. To sign up, go to [email or web address] . Click on \"Log in\" on the left side of the screen, which will take you to the Welcome page. Then click on \"Sign up Now\" on the right side of the page.     You will be asked to enter the access code listed below, as well as some personal information. Please follow the directions to create your username and password.     Your access code is: [email or web address]  Expires: 2017 10:50 AM     Your access code will  in 90 days. If you need help or a new code, please call your [place] clinic or [phone].        Care EveryWhere ID     This is your [facility] EveryWhere ID. This could be used by other organizations to access your [place] medical records  [id number]        Your Vitals Were     Pulse Temperature Respirations Height Pulse Oximetry Breastfeeding?    75 98.5  F (36.9  C) (Oral) 16 6' (1.829 m) 98% No    BMI (Body Mass Index)                   29.16 kg/m2            Blood Pressure from Last 3 Encounters:   17 110/72   17 132/87   17 100/80    Weight from Last 3 Encounters:   17 215 lb (97.5 kg)   17 219 lb (99.3 kg)   17 230 lb (104.3 kg)              We Performed the Following     OB/GYN REFERRAL          Today's Medication Changes          These changes are accurate as of: 17  9:27 AM.  If you have any questions, ask your nurse or doctor.               Stop taking these medicines if you haven't already. Please contact your care team if you have questions.     ARIPiprazole 10 MG tablet   Commonly known as:  ABILIFY   Stopped by:  Mara Rico PA-C           CLARITIN PO   Stopped by:  Mara Rico PA-C           traZODone 50 MG tablet   Commonly " known as:  CARLY   Stopped by:  Mara Rico PA-C                    Primary Care Provider Office Phone # Fax #    Mara Rico PA-C [phone]       [address] [facility] [address]        Thank you!     Thank you for choosing [facility]  for your care. Our goal is always to provide you with excellent care. Hearing back from our patients is one way we can continue to improve our services. Please take a few minutes to complete the written survey that you may receive in the mail after your visit with us. Thank you!             Your Updated Medication List - Protect others around you: Learn how to safely use, store and throw away your medicines at www.disposemymeds.org.          This list is accurate as of: 5/12/17  9:27 AM.  Always use your most recent med list.                   Brand Name Dispense Instructions for use    BIOTIN PO          CALCIUM PO      Take 2 capsules by mouth daily       FISH OIL PO      Take 2 capsules by mouth daily       ONE-A-DAY MENOPAUSE HEALTH PO

## 2017-05-12 NOTE — NURSING NOTE
Chief Complaint   Patient presents with     Er F/u       Initial /72 (BP Location: Left arm, Patient Position: Chair, Cuff Size: Adult Large)  Pulse 75  Temp 98.5  F (36.9  C) (Oral)  Resp 16  Ht 6' (1.829 m)  Wt 215 lb (97.5 kg)  SpO2 98%  Breastfeeding? No  BMI 29.16 kg/m2 Estimated body mass index is 29.16 kg/(m^2) as calculated from the following:    Height as of this encounter: 6' (1.829 m).    Weight as of this encounter: 215 lb (97.5 kg).  Medication Reconciliation: complete Selene Dent MA  Health Maintenance has been reviewed.

## 2017-05-12 NOTE — PROGRESS NOTES
"    SUBJECTIVE:                                                    Christiana Hill is a 51 year old female who presents to clinic today for the following health issues:          Hospital Follow-up Visit:    Hospital/Nursing Home/ Rehab Facility: [facility]  Date of Admission: 4/25/17  Date of Discharge: 4/27/17  Reason(s) for Admission: bipolar 1, psychosis, insomnai          FOLLOWUP APPOINTMENTS: ( per social workers notes and after visit summary)  1. Psychotherapy through [facility] on May 9  2. Medication management through [facility] on June 14  3. Primary care appointment through [facility] in [place] May 12           Problems taking medications regularly:  No stopped all medications       Medication changes since discharge: Patient stopped all meds       Problems adhering to non-medication therapy:  None  Went to [facility] for therapy appointment 2 days ago  Summary of hospitalization:  [facility] discharge summary reviewed  Diagnostic Tests/Treatments reviewed.  Follow up needed: psychiatry, psychology  Other Healthcare Providers Involved in Patient s Care:         None  Update since discharge: improved.     Post Discharge Medication Reconciliation: discharge medications reconciled, continue medications without change.  Plan of care communicated with patient     Coding guidelines for this visit:  Type of Medical   Decision Making Face-to-Face Visit       within 7 Days of discharge Face-to-Face Visit        within 14 days of discharge   Moderate Complexity 72059 [id number]   High Complexity 68003 49643          Patient states she \"just didn't really sleep for 1 month\" b/c she had \"too much to do\".  Patient states she was not \"appropriate for the psych wright, when I was only sleep deprived and needed a day of sleep\". Patient has family h/o mental illness and bipolar d/o but feels she does NOT have this.  She is not willing to take any medications until " evaluated by Psychiatry at [facility].  No SI or HI.  Sleeping well now.   Denies racing thoughts or excessive spending.      Problem list and histories reviewed & adjusted, as indicated.  Additional history: as documented    Patient Active Problem List   Diagnosis     Status post tonsillectomy     Status post uvulopalatopharyngoplasty     ALPHONSO (obstructive sleep apnea)     History of colon cancer, no staging     CARDIOVASCULAR SCREENING; LDL GOAL LESS THAN 160     Abnormal kidney function study     Degeneration of cervical intervertebral disc     Facet arthropathy, cervical (H)     Cervical spinal stenosis     Foraminal stenosis of cervical region     Menopausal syndrome (hot flashes)     Obesity     Ella (H)     Bipolar 1 disorder (H)     Past Surgical History:   Procedure Laterality Date      SECTION       ENT SURGERY      tonsils and uvula removed     HEMICOLECTOMY, RT/LT      right     LASIK       LIPOSUCTION (LOCATION)       MAMMOPLASTY AUGMENTATION       TUBAL LIGATION         Social History   Substance Use Topics     Smoking status: Never Smoker     Smokeless tobacco: Never Used     Alcohol use Yes      Comment: once a week     Family History   Problem Relation Age of Onset     Thyroid Disease Mother      Breast Cancer No family hx of            Reviewed and updated as needed this visit by clinical staff  Tobacco  Allergies  Med Hx  Surg Hx  Fam Hx  Soc Hx      Reviewed and updated as needed this visit by Provider         ROS:  Constitutional, HEENT, cardiovascular, pulmonary, GI, , musculoskeletal, neuro, skin, endocrine and psych systems are negative, except as otherwise noted.    OBJECTIVE:                                                    /72 (BP Location: Left arm, Patient Position: Chair, Cuff Size: Adult Large)  Pulse 75  Temp 98.5  F (36.9  C) (Oral)  Resp 16  Ht 6' (1.829 m)  Wt 215 lb (97.5 kg)  SpO2 98%  Breastfeeding? No  BMI 29.16 kg/m2  Body mass index is 29.16  "kg/(m^2).  GENERAL APPEARANCE: healthy, alert and no distress  RESP: lungs clear to auscultation - no rales, rhonchi or wheezes  CV: regular rates and rhythm, normal S1 S2, no S3 or S4 and no murmur, click or rub  SKIN: no suspicious lesions or rashes  NEURO: Normal strength and tone, mentation intact and speech normal  PSYCH: mentation appears normal, patient appearance-- well groomed, neatly dressed and anxious         ASSESSMENT/PLAN:                                                            1. Bipolar 1 disorder (H)  After long discussion with pt.   She still appears a little \"wound up\" and slightly manic  She is no danger to herself or others at this time.  She declines medication multiple times during our OV, but agrees to see Psychiatry on June 14.      2. Symptomatic menopausal or female climacteric states  At end of OV request referral to OBGYN  - OB/GYN REFERRAL        Mara Rico PA-C, BRENDA  [facility]"

## 2017-06-07 ENCOUNTER — TELEPHONE (OUTPATIENT)
Dept: FAMILY MEDICINE | Facility: CLINIC | Age: 52
End: 2017-06-07

## 2017-06-07 NOTE — TELEPHONE ENCOUNTER
Pt no longer comes here for physicals and mammos.  She moved and goes to [facility] in [place].  Pt has a physical coming up and will sign a SRIRAM at her appt so that we can update her records. Shari Schoenberger, CMA

## 2017-06-07 NOTE — TELEPHONE ENCOUNTER
Panel Management Review      Patient has the following on her problem list: None      Composite cancer screening  Chart review shows that this patient is due/due soon for the following Pap Smear and Mammogram  Summary:    Patient is due/failing the following:   MAMMOGRAM, PAP and PHYSICAL    Action needed:   Patient needs office visit for physical with pap. and Patient needs referral/order: Mammo    Type of outreach:    Phone, left message for patient to call back.     Questions for provider review:    None                                                                                                                                  Selene Dent MA       Chart routed to Care Team .

## 2017-06-19 ENCOUNTER — OFFICE VISIT (OUTPATIENT)
Dept: URGENT CARE | Facility: URGENT CARE | Age: 52
End: 2017-06-19
Payer: COMMERCIAL

## 2017-06-19 VITALS
BODY MASS INDEX: 28.79 KG/M2 | TEMPERATURE: 98 F | DIASTOLIC BLOOD PRESSURE: 76 MMHG | WEIGHT: 212.3 LBS | OXYGEN SATURATION: 97 % | SYSTOLIC BLOOD PRESSURE: 116 MMHG | HEART RATE: 73 BPM

## 2017-06-19 DIAGNOSIS — M54.2 NECK PAIN: ICD-10-CM

## 2017-06-19 DIAGNOSIS — M50.30 DDD (DEGENERATIVE DISC DISEASE), CERVICAL: Primary | ICD-10-CM

## 2017-06-19 DIAGNOSIS — M62.838 MUSCLE SPASMS OF NECK: ICD-10-CM

## 2017-06-19 PROCEDURE — 99214 OFFICE O/P EST MOD 30 MIN: CPT | Performed by: PHYSICIAN ASSISTANT

## 2017-06-19 RX ORDER — METHYLPREDNISOLONE 4 MG
TABLET, DOSE PACK ORAL
Qty: 21 TABLET | Refills: 0 | Status: SHIPPED | OUTPATIENT
Start: 2017-06-19 | End: 2017-10-02

## 2017-06-19 RX ORDER — METHOCARBAMOL 500 MG/1
1000 TABLET, FILM COATED ORAL 3 TIMES DAILY PRN
Qty: 30 TABLET | Refills: 1 | Status: SHIPPED | OUTPATIENT
Start: 2017-06-19 | End: 2017-10-02

## 2017-06-19 RX ORDER — OXYCODONE AND ACETAMINOPHEN 5; 325 MG/1; MG/1
1 TABLET ORAL EVERY 8 HOURS PRN
Qty: 8 TABLET | Refills: 0 | Status: SHIPPED | OUTPATIENT
Start: 2017-06-19 | End: 2017-10-02

## 2017-06-19 NOTE — NURSING NOTE
Chief Complaint   Patient presents with     Generalized Body Aches     Back pain, neck pain and headache x 2 days      Nausea     x 2 days        Initial /76 (BP Location: Left arm, Patient Position: Chair, Cuff Size: Adult Regular)  Pulse 73  Temp 98  F (36.7  C) (Oral)  Wt 212 lb 4.8 oz (96.3 kg)  SpO2 97%  BMI 28.79 kg/m2 Estimated body mass index is 28.79 kg/(m^2) as calculated from the following:    Height as of 5/12/17: 6' (1.829 m).    Weight as of this encounter: 212 lb 4.8 oz (96.3 kg).  Medication Reconciliation: complete

## 2017-06-19 NOTE — MR AVS SNAPSHOT
"              After Visit Summary   2017    Christiana Hill    MRN: [id number]           Patient Information     Date Of Birth          1965        Visit Information        Provider Department      2017 12:50 PM Nestor Metz PA-C [facility]        Today's Diagnoses     DDD (degenerative disc disease), cervical    -  1    Neck pain        Muscle spasms of neck           Follow-ups after your visit        Who to contact     If you have questions or need follow up information about today's clinic visit or your schedule please contact [facility] directly at [phone].  Normal or non-critical lab and imaging results will be communicated to you by Aircarehart, letter or phone within 4 business days after the clinic has received the results. If you do not hear from us within 7 days, please contact the clinic through Aircarehart or phone. If you have a critical or abnormal lab result, we will notify you by phone as soon as possible.  Submit refill requests through [employer] or call your pharmacy and they will forward the refill request to us. Please allow 3 business days for your refill to be completed.          Additional Information About Your Visit        MyChart Information     [employer] lets you send messages to your doctor, view your test results, renew your prescriptions, schedule appointments and more. To sign up, go to [email or web address] . Click on \"Log in\" on the left side of the screen, which will take you to the Welcome page. Then click on \"Sign up Now\" on the right side of the page.     You will be asked to enter the access code listed below, as well as some personal information. Please follow the directions to create your username and password.     Your access code is: [email or web address]  Expires: 2017 10:50 AM     Your access code will  in 90 days. If you need help or a new code, please call your [place] clinic or [phone].      "   Care EveryWhere ID     This is your Care EveryWhere ID. This could be used by other organizations to access your [place] medical records  [id number]        Your Vitals Were     Pulse Temperature Pulse Oximetry BMI (Body Mass Index)          73 98  F (36.7  C) (Oral) 97% 28.79 kg/m2         Blood Pressure from Last 3 Encounters:   06/19/17 116/76   05/12/17 110/72   04/25/17 132/87    Weight from Last 3 Encounters:   06/19/17 212 lb 4.8 oz (96.3 kg)   05/12/17 215 lb (97.5 kg)   04/25/17 219 lb (99.3 kg)              Today, you had the following     No orders found for display         Today's Medication Changes          These changes are accurate as of: 6/19/17  1:18 PM.  If you have any questions, ask your nurse or doctor.               Start taking these medicines.        Dose/Directions    methocarbamol 500 MG tablet   Commonly known as:  ROBAXIN   Used for:  Neck pain, Muscle spasms of neck   Started by:  Nestor Metz PA-C        Dose:  1000 mg   Take 2 tablets (1,000 mg) by mouth 3 times daily as needed for muscle spasms   Quantity:  30 tablet   Refills:  1       methylPREDNISolone 4 MG tablet   Commonly known as:  MEDROL DOSEPAK   Used for:  DDD (degenerative disc disease), cervical   Started by:  Nestor Metz PA-C        Follow package instructions   Quantity:  21 tablet   Refills:  0       oxyCODONE-acetaminophen 5-325 MG per tablet   Commonly known as:  PERCOCET   Used for:  Neck pain   Started by:  Nestor Metz PA-C        Dose:  1 tablet   Take 1 tablet by mouth every 8 hours as needed for pain   Quantity:  8 tablet   Refills:  0            Where to get your medicines      These medications were sent to [facility] [address] [address]     Phone:  [phone]     methocarbamol 500 MG tablet    methylPREDNISolone 4 MG tablet         Some of these will need a paper prescription and others can be bought over the counter.   Ask your nurse if you have questions.     Bring a paper prescription for each of these medications     oxyCODONE-acetaminophen 5-325 MG per tablet                Primary Care Provider Office Phone # Fax #    Mara Rico PA-C [phone]       [address] [address]        Thank you!     Thank you for choosing [facility]  for your care. Our goal is always to provide you with excellent care. Hearing back from our patients is one way we can continue to improve our services. Please take a few minutes to complete the written survey that you may receive in the mail after your visit with us. Thank you!             Your Updated Medication List - Protect others around you: Learn how to safely use, store and throw away your medicines at www.disposemymeds.org.          This list is accurate as of: 6/19/17  1:18 PM.  Always use your most recent med list.                   Brand Name Dispense Instructions for use    BIOTIN PO          CALCIUM PO      Take 2 capsules by mouth daily       FISH OIL PO      Take 2 capsules by mouth daily       methocarbamol 500 MG tablet    ROBAXIN    30 tablet    Take 2 tablets (1,000 mg) by mouth 3 times daily as needed for muscle spasms       methylPREDNISolone 4 MG tablet    MEDROL DOSEPAK    21 tablet    Follow package instructions       ONE-A-DAY MENOPAUSE HEALTH PO          oxyCODONE-acetaminophen 5-325 MG per tablet    PERCOCET    8 tablet    Take 1 tablet by mouth every 8 hours as needed for pain       VITAMIN D (CHOLECALCIFEROL) PO      Take by mouth daily

## 2017-06-19 NOTE — LETTER
[facility] [facility]  [address] [address]  [phone]      June 19, 2017    RE:  Christiana Hill                                                                                                                                                       [address]  [facility] [address]            To whom it may concern:    Christiana Hill was seen in the urgent care today for neck pain.         Sincerely,        Nestor Metz [facility]

## 2017-06-19 NOTE — PROGRESS NOTES
SUBJECTIVE:  Chief Complaint   Patient presents with     Generalized Body Aches     Back pain, neck pain and headache x 2 days      Nausea     x 2 days      Christiana Hill is a 51 year old female presents with a chief complaint of right side neck and upper back pain with spasms .  How: neck, upper back spasms and pain.  The patient complained of moderate pain  and has had decreased ROM.  Pain exacerbated by movement.  Relieved by rest.  She treated it initially with Tylenol. This is not the first time this type of injury has occurred to this patient.     Past Medical History:   Diagnosis Date     Bipolar 1 disorder (H) 5/12/2017     Cancer (H) 2007    colon     History of colon cancer, no staging 9/20/2013     History of sleep apnea 9/20/2013     ALPHONSO (obstructive sleep apnea) 9/20/2013     Status post tonsillectomy 9/20/2013     Status post uvulopalatopharyngoplasty 9/20/2013     No Known Allergies  Social History   Substance Use Topics     Smoking status: Never Smoker     Smokeless tobacco: Never Used     Alcohol use Yes      Comment: once a week       ROS:  CONSTITUTIONAL:NEGATIVE for fever, chills, change in weight  INTEGUMENTARY/SKIN: NEGATIVE for worrisome rashes, moles or lesions  ENT/MOUTH: NEGATIVE for ear, mouth and throat problems  RESP:NEGATIVE for significant cough or SOB  CV: NEGATIVE for chest pain, palpitations or peripheral edema  GI: NEGATIVE for nausea, abdominal pain, heartburn, or change in bowel habits  MUSCULOSKELETAL: Positive for right side neck, upper back spasms and pain  NEURO: NEGATIVE for weakness, dizziness or paresthesias    EXAM:   /76 (BP Location: Left arm, Patient Position: Chair, Cuff Size: Adult Regular)  Pulse 73  Temp 98  F (36.7  C) (Oral)  Wt 212 lb 4.8 oz (96.3 kg)  SpO2 97%  BMI 28.79 kg/m2  Gen: healthy,alert,no distress  Extremity: full ROM of upper extremities.   There is not compromise to the distal circulation.  Pulses are +2 and CRT is brisk  GENERAL  APPEARANCE: healthy, alert and no distress  NECK:  tenderness to palpation along posterior aspect right side  CHEST: clear to auscultation  CV: regular rate and rhythm  EXTREMITIES: peripheral pulses normal  MS:  Positive for upper back, neck tenderness with spasms  SKIN: no suspicious lesions or rashes  NEURO: Normal strength and tone, sensory exam grossly normal, mentation intact and speech normal    X-RAY was done and negative for acute changes including fracture Xray read by Nestor Metz at time of visit    ASSESSMENT/PLAN:      ICD-10-CM    1. DDD (degenerative disc disease), cervical M50.30 methylPREDNISolone (MEDROL DOSEPAK) 4 MG tablet   2. Neck pain M54.2 methocarbamol (ROBAXIN) 500 MG tablet     oxyCODONE-acetaminophen (PERCOCET) 5-325 MG per tablet   3. Muscle spasms of neck M62.838 methocarbamol (ROBAXIN) 500 MG tablet       ROM exercises  Follow up as needed

## 2017-07-11 ENCOUNTER — OFFICE VISIT (OUTPATIENT)
Dept: OBGYN | Facility: CLINIC | Age: 52
End: 2017-07-11
Payer: COMMERCIAL

## 2017-07-11 VITALS
DIASTOLIC BLOOD PRESSURE: 58 MMHG | SYSTOLIC BLOOD PRESSURE: 102 MMHG | WEIGHT: 208 LBS | BODY MASS INDEX: 28.17 KG/M2 | OXYGEN SATURATION: 99 % | HEIGHT: 72 IN | HEART RATE: 68 BPM

## 2017-07-11 DIAGNOSIS — N89.8 VAGINAL IRRITATION: ICD-10-CM

## 2017-07-11 DIAGNOSIS — Z11.51 SCREENING FOR HUMAN PAPILLOMAVIRUS: ICD-10-CM

## 2017-07-11 DIAGNOSIS — Z01.419 WOMEN'S ANNUAL ROUTINE GYNECOLOGICAL EXAMINATION: Primary | ICD-10-CM

## 2017-07-11 DIAGNOSIS — Z12.4 PAP SMEAR FOR CERVICAL CANCER SCREENING: ICD-10-CM

## 2017-07-11 LAB
MICRO REPORT STATUS: NORMAL
SPECIMEN SOURCE: NORMAL
WET PREP SPEC: NORMAL

## 2017-07-11 PROCEDURE — 99386 PREV VISIT NEW AGE 40-64: CPT | Performed by: ADVANCED PRACTICE MIDWIFE

## 2017-07-11 PROCEDURE — 87210 SMEAR WET MOUNT SALINE/INK: CPT | Performed by: ADVANCED PRACTICE MIDWIFE

## 2017-07-11 PROCEDURE — 87624 HPV HI-RISK TYP POOLED RSLT: CPT | Performed by: ADVANCED PRACTICE MIDWIFE

## 2017-07-11 PROCEDURE — G0145 SCR C/V CYTO,THINLAYER,RESCR: HCPCS | Performed by: ADVANCED PRACTICE MIDWIFE

## 2017-07-11 NOTE — PATIENT INSTRUCTIONS
Preventive Health Recommendations  Female Ages over age 50      Yearly exam:     See your health care provider every year in order to:    1.Review health changes.  2. Discuss preventive care.  3. Review your medicines if your provider has prescribed any      Get a Pap test every five years with co-testing for HPV (unless you have an abnormal result and your provider advises testing more often)       You do not need a Pap test if your uterus was removed (hysterectomy) and you have not had cancer      You should be tested each year for STIs (sexually transmitted diseases) if you are at risk    Have a mammogram every year     Have a colonoscopy at age 50, or have a yearly FIT test (stool test). These exams screen for colon cancer.  Screening typically occurs every 10 years or more often if you have a family history or significant risk factors    Have a cholesterol test every 3-5 years, or more often if advised    Have a diabetes test (fasting glucose) every three years. If you are at risk for diabetes, you should have this test more often       Screening for thyroid disease and vitamin D deficiency are also beneficial every 3-5 years and as needed      If you are at risk for osteoporosis (brittle bone disease), think about having a bone density scan (DEXA)      You may experience perimenopausal symptoms such as menstrual changes, hot flashes, night sweats, irritability, mood changes, vaginal dryness, and weight gain. Symptoms can be managed with lifestyle changes and/or medications for hormone replacement therapy (HRT). Talk with your provider about HRT if you are interested. You are considered menopausal after one year without having a menstrual cycle.        Shots:     Get a flu shot each year. Get a tetanus shot every 10 years.          Nutrition:       Eat at least 5 servings of fruits and vegetables each day      Eat REAL food, stay away from processed food      Eat whole-grain bread, whole-wheat pasta and brown  rice instead of white grains and rice      For bone health:  Eat calcium-rich foods or take calcium pills up to 1200 mg. Also take vitamin D (2000 IUs) each day.     Lifestyle      Exercise at least 30 minutes a day, 5 days a week. This will help you control your weight and prevent disease.      Include weight bearing exercise into your exercise routine to help decrease your risk for osteoporosis      Limit alcohol to one drink per day.      No smoking      Wear sunscreen to prevent skin cancer      See your dentist every six months to one year for an exam and cleaning      See your eye doctor every 1 to 2 years

## 2017-07-11 NOTE — LETTER
July 31, 2017    Christiana Hill  [address] [facility] [address]    Dear Christiana,  We are happy to inform you that your PAP smear result from 7/11/17 is normal.  We are now able to do a follow up test on PAP smears. The DNA test is for HPV (Human Papilloma Virus). Cervical cancer is closely linked with certain types of HPV. Your result showed no evidence of high risk HPV.  Therefore we recommend you return in 3 years for your next pap smear and HPV test.  You will still need to return to the clinic every year for an annual exam and other preventive tests.  Please contact the clinic at [phone] with any questions.  Sincerely,    DULCE KING CNM/joselin

## 2017-07-11 NOTE — PROGRESS NOTES
Christiana is a 51 year old  female who presents for annual exam.     Besides routine health maintenance,  she would like to discuss options to increase her libido.  HPI:  Christiana presents for annual exam. She is postmenopausal x 2 years. Has hot flashes and vaginal dryness. Sex has become painful for her.   Patient's last menstrual period was 2015..     REPRODUCTIVE/GYNECOLOGIC HISTORY:  Christiana is sexually active with male partner(s) and is currently in monogamous relationship.   STI testing offered?  Declined  History of abnormal Pap smear:  No  SOCIAL HISTORY  Abuse: does not report having previously been physical or sexually abused.    Do you feel safe in your environment? YES    She  reports that she has never smoked. She has never used smokeless tobacco.      Last PHQ-9 score on record = No flowsheet data found.  Last GAD7 score on record = No flowsheet data found.      HEALTH MAINTENANCE:  Cholesterol: (  Cholesterol   Date Value Ref Range Status   10/16/2013 195 0 - 200 mg/dL Final     Comment:     LDL Cholesterol is the primary guide to therapy.   The NCEP recommends further evaluation of: patients with cholesterol greater   than 200 mg/dL if additional risk factors are present, cholesterol greater   than   240 mg/dL, triglycerides greater than 150 mg/dL, or HDL less than 40 mg/dL.    History of abnormal lipids: No  Mammo:  WNL . History of abnormal Mammo: No, history of bilateral breast implants.  Regular Self Breast Exams: Yes  TSH: (  TSH   Date Value Ref Range Status   2017 2.18 0.40 - 4.00 mU/L Final    )  Pap; (  Lab Results   Component Value Date    PAP OTHER-NIL, See Result 10/16/2013    )  Immunizations up to date: yes  (Gardasil, Tdap, Flu)  Health maintenance updated:  yes    HEALTHY HABITS  Eating habits: follows a balanced nutrition diet  Calcium/Vitamin D intake: source:  dairy Adequate?   Exercise: How often do you exercise? 3-5 times/week;Cardio  Have you had an eye exam in  the last two years? YES  Do you routinely see the dentist once or twice yearly? YES      HISTORY:  Obstetric History       T1      L0     SAB0   TAB0   Ectopic0   Multiple0   Live Births0       # Outcome Date GA Lbr Emmanuel/2nd Weight Sex Delivery Anes PTL Lv   1 Term               Obstetric Comments   Child passed away at 9 months from severe chromosomal abnormalities     Past Medical History:   Diagnosis Date     Bipolar 1 disorder (H) 2017     Cancer (H)     colon     History of colon cancer, no staging 2013     History of sleep apnea 2013     ALPHONSO (obstructive sleep apnea) 2013     Status post tonsillectomy 2013     Status post uvulopalatopharyngoplasty 2013     Past Surgical History:   Procedure Laterality Date      SECTION       ENT SURGERY      tonsils and uvula removed     HEMICOLECTOMY, RT/LT      right     LASIK       LIPOSUCTION (LOCATION)       MAMMOPLASTY AUGMENTATION       TUBAL LIGATION       Family History   Problem Relation Age of Onset     Thyroid Disease Mother      Breast Cancer No family hx of      Social History     Social History     Marital status:      Spouse name: N/A     Number of children: N/A     Years of education: N/A     Occupational History      school psychologist Self     Social History Main Topics     Smoking status: Never Smoker     Smokeless tobacco: Never Used     Alcohol use Yes      Comment: once a week     Drug use: No     Sexual activity: Yes     Partners: Male     Birth control/ protection: Surgical     Other Topics Concern      Service No     Blood Transfusions No     Caffeine Concern No     Occupational Exposure Yes     Hobby Hazards No     Sleep Concern No     Stress Concern No     Weight Concern No     Special Diet Yes     no red meat     Exercise Yes     Bike Helmet No     Seat Belt Yes     Self-Exams Yes     Social History Narrative    ** Merged History Encounter **         Pt is recently   8/2013 and has 3 teen step-children.         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        Current Outpatient Prescriptions:      VITAMIN D, CHOLECALCIFEROL, PO, Take by mouth daily, Disp: , Rfl:      BIOTIN PO, , Disp: , Rfl:      Omega-3 Fatty Acids (FISH OIL PO), Take 2 capsules by mouth daily , Disp: , Rfl:      CALCIUM PO, Take 2 capsules by mouth daily , Disp: , Rfl:      Specialty Vitamins Products (ONE-A-DAY MENOPAUSE HEALTH PO), , Disp: , Rfl:      methylPREDNISolone (MEDROL DOSEPAK) 4 MG tablet, Follow package instructions (Patient not taking: Reported on 7/11/2017), Disp: 21 tablet, Rfl: 0     methocarbamol (ROBAXIN) 500 MG tablet, Take 2 tablets (1,000 mg) by mouth 3 times daily as needed for muscle spasms (Patient not taking: Reported on 7/11/2017), Disp: 30 tablet, Rfl: 1     oxyCODONE-acetaminophen (PERCOCET) 5-325 MG per tablet, Take 1 tablet by mouth every 8 hours as needed for pain (Patient not taking: Reported on 7/11/2017), Disp: 8 tablet, Rfl: 0   No Known Allergies    Past medical, surgical, social and family history were reviewed and updated in EPIC.    ROS:   C: NEGATIVE for fever, chills, change in weight  I: NEGATIVE for worrisome rashes, moles or lesions  E: NEGATIVE for vision changes or irritation  ENT: NEGATIVE for ear, mouth and throat problems  R: NEGATIVE for significant cough or SOB  B: NEGATIVE for masses, tenderness or discharge  CV: NEGATIVE for chest pain, palpitations or peripheral edema  GI: NEGATIVE for nausea, abdominal pain, heartburn, or change in bowel habits  : NEGATIVE for unusual urinary or vaginal symptoms. No vaginal bleeding.  M: NEGATIVE for significant arthralgias or myalgia  N: NEGATIVE for weakness, dizziness or paresthesias  E: NEGATIVE for temperature intolerance, skin/hair changes  H: NEGATIVE for bleeding problems  P: NEGATIVE for changes in mood or affect     PHYSICAL EXAM:  /58 (BP Location: Right arm, Cuff Size:  "Adult Regular)  Pulse 68  Ht 6' (1.829 m)  Wt 208 lb (94.3 kg)  LMP 04/11/2015  SpO2 99%  BMI 28.21 kg/m2   BMI: Body mass index is 28.21 kg/(m^2).  Constitutional: healthy, alert and no distress  Neck: symmetrical, thyroid normal size, no masses present, no lymphadenopathy present.   Cardiovascular: RRR, no murmurs present  Respiratory: breathing unlabored, lungs CTA bilaterally  Breast:normal without masses, tenderness or nipple discharge and no palpable axillary masses or adenopathy, bilateral breast implants  Gastrointestinal: abdomen soft, non-tender, bowel sounds present  PELVIC EXAM:  Vulva: No lesions, no adenopathy, BUS WNL  Vagina: Dry mucosa, pink, discharge normal  well rugated, no lesions  Cervix:smooth, pink, no visible lesions, pap obtained  Uterus: Normal size, non-tender, mobile  Ovaries: No masses palpated  Rectal exam: deferred    ASSESSMENT/PLAN:    ICD-10-CM    1. Women's annual routine gynecological examination Z01.419 TSH with free T4 reflex     **Lipid panel reflex to direct LDL FUTURE anytime   2. Pap smear for cervical cancer screening Z12.4 PAP imaged thin layer screen   3. Screening for human papillomavirus Z11.51 HPV High Risk Types DNA Cervical   4. Vaginal irritation N89.8 Wet prep     Results for orders placed or performed in visit on 07/11/17   Wet prep   Result Value Ref Range    Specimen Description Vagina     Wet Prep       No Trichomonas seen  No clue cells seen  No yeast seen      Micro Report Status FINAL 07/11/2017      Annual Exam:    Exam appropriate for age. Pap pending. Will advise patient of pap/HPV result when available.     Vaginal irritation:  Patient plans to try OTC \"Replens\" for vaginal lubrication. If not helpful and if all labs normal, will consider topical estrogen cream.     COUNSELING:   Reviewed preventive health counseling, as reflected in patient instructions       Regular exercise       Healthy diet/nutrition   reports that she has never smoked. She " has never used smokeless tobacco.      SIRIA Rowley, CNM

## 2017-07-11 NOTE — MR AVS SNAPSHOT
After Visit Summary   7/11/2017    Christiana Hill    MRN: [id number]           Patient Information     Date Of Birth          1965        Visit Information        Provider Department      7/11/2017 11:30 AM Norma Garrett CNM [facility]        Today's Diagnoses     Pap smear for cervical cancer screening    -  1    Screening for human papillomavirus        Vaginal irritation          Care Instructions    Preventive Health Recommendations  Female Ages over age 50      Yearly exam:     See your health care provider every year in order to:    1.Review health changes.  2. Discuss preventive care.  3. Review your medicines if your provider has prescribed any      Get a Pap test every five years with co-testing for HPV (unless you have an abnormal result and your provider advises testing more often)       You do not need a Pap test if your uterus was removed (hysterectomy) and you have not had cancer      You should be tested each year for STIs (sexually transmitted diseases) if you are at risk    Have a mammogram every year     Have a colonoscopy at age 50, or have a yearly FIT test (stool test). These exams screen for colon cancer.  Screening typically occurs every 10 years or more often if you have a family history or significant risk factors    Have a cholesterol test every 3-5 years, or more often if advised    Have a diabetes test (fasting glucose) every three years. If you are at risk for diabetes, you should have this test more often       Screening for thyroid disease and vitamin D deficiency are also beneficial every 3-5 years and as needed      If you are at risk for osteoporosis (brittle bone disease), think about having a bone density scan (DEXA)      You may experience perimenopausal symptoms such as menstrual changes, hot flashes, night sweats, irritability, mood changes, vaginal dryness, and weight gain. Symptoms can be managed with lifestyle changes and/or  medications for hormone replacement therapy (HRT). Talk with your provider about HRT if you are interested. You are considered menopausal after one year without having a menstrual cycle.        Shots:     Get a flu shot each year. Get a tetanus shot every 10 years.          Nutrition:       Eat at least 5 servings of fruits and vegetables each day      Eat REAL food, stay away from processed food      Eat whole-grain bread, whole-wheat pasta and brown rice instead of white grains and rice      For bone health:  Eat calcium-rich foods or take calcium pills up to 1200 mg. Also take vitamin D (2000 IUs) each day.     Lifestyle      Exercise at least 30 minutes a day, 5 days a week. This will help you control your weight and prevent disease.      Include weight bearing exercise into your exercise routine to help decrease your risk for osteoporosis      Limit alcohol to one drink per day.      No smoking      Wear sunscreen to prevent skin cancer      See your dentist every six months to one year for an exam and cleaning      See your eye doctor every 1 to 2 years            Follow-ups after your visit        Who to contact     If you have questions or need follow up information about today's clinic visit or your schedule please contact [facility] directly at [phone].  Normal or non-critical lab and imaging results will be communicated to you by MyChart, letter or phone within 4 business days after the clinic has received the results. If you do not hear from us within 7 days, please contact the clinic through In Flowhart or phone. If you have a critical or abnormal lab result, we will notify you by phone as soon as possible.  Submit refill requests through Tripl or call your pharmacy and they will forward the refill request to us. Please allow 3 business days for your refill to be completed.          Additional Information About Your Visit        In Flowhart Information     Tripl lets you send  "messages to your doctor, view your test results, renew your prescriptions, schedule appointments and more. To sign up, go to [email or web address] . Click on \"Log in\" on the left side of the screen, which will take you to the Welcome page. Then click on \"Sign up Now\" on the right side of the page.     You will be asked to enter the access code listed below, as well as some personal information. Please follow the directions to create your username and password.     Your access code is: [email or web address]  Expires: 10/9/2017 12:30 PM     Your access code will  in 90 days. If you need help or a new code, please call your [place] clinic or [phone].        Care EveryWhere ID     This is your [facility] EveryWhere ID. This could be used by other organizations to access your [place] medical records  [id number]        Your Vitals Were     Pulse Height Last Period Pulse Oximetry BMI (Body Mass Index)       68 6' (1.829 m) 2015 99% 28.21 kg/m2        Blood Pressure from Last 3 Encounters:   17 102/58   17 116/76   17 110/72    Weight from Last 3 Encounters:   17 208 lb (94.3 kg)   17 212 lb 4.8 oz (96.3 kg)   17 215 lb (97.5 kg)              We Performed the Following     HPV High Risk Types DNA Cervical     PAP imaged thin layer screen     Wet prep        Primary Care Provider Office Phone # Fax #    Mara Rachel Rico PA-C [phone]       [address] [address]        Equal Access to Services     ALLIE FALCON : Hadii yeimy Espitia, noheliada lurashid, qaybta kaalmaezequiel jacobs. So [facility] [phone].    ATENCIÓN: Si habla español, tiene a coffman disposición servicios gratuitos de asistencia lingüística. Llame al [phone].    We comply with applicable federal civil rights laws and [place] laws. We do not discriminate on the basis of race, color, national origin, age, " disability sex, sexual orientation or gender identity.            Thank you!     Thank you for choosing [facility]  for your care. Our goal is always to provide you with excellent care. Hearing back from our patients is one way we can continue to improve our services. Please take a few minutes to complete the written survey that you may receive in the mail after your visit with us. Thank you!             Your Updated Medication List - Protect others around you: Learn how to safely use, store and throw away your medicines at www.disposemymeds.org.          This list is accurate as of: 7/11/17 12:30 PM.  Always use your most recent med list.                   Brand Name Dispense Instructions for use Diagnosis    BIOTIN PO           CALCIUM PO      Take 2 capsules by mouth daily        FISH OIL PO      Take 2 capsules by mouth daily        methocarbamol 500 MG tablet    ROBAXIN    30 tablet    Take 2 tablets (1,000 mg) by mouth 3 times daily as needed for muscle spasms    Neck pain, Muscle spasms of neck       methylPREDNISolone 4 MG tablet    MEDROL DOSEPAK    21 tablet    Follow package instructions    DDD (degenerative disc disease), cervical       ONE-A-DAY MENOPAUSE HEALTH PO           oxyCODONE-acetaminophen 5-325 MG per tablet    PERCOCET    8 tablet    Take 1 tablet by mouth every 8 hours as needed for pain    Neck pain       VITAMIN D (CHOLECALCIFEROL) PO      Take by mouth daily

## 2017-07-11 NOTE — NURSING NOTE
Chief Complaint   Patient presents with     Gyn Exam   pap due  Painful intercourse - menopause  Initial /58 (BP Location: Right arm, Cuff Size: Adult Regular)  Pulse 68  Ht 6' (1.829 m)  Wt 208 lb (94.3 kg)  LMP 04/11/2015  SpO2 99%  BMI 28.21 kg/m2 Estimated body mass index is 28.21 kg/(m^2) as calculated from the following:    Height as of this encounter: 6' (1.829 m).    Weight as of this encounter: 208 lb (94.3 kg).  Medication Reconciliation: complete   Joanna Bergeron MA

## 2017-07-12 DIAGNOSIS — Z01.419 WOMEN'S ANNUAL ROUTINE GYNECOLOGICAL EXAMINATION: ICD-10-CM

## 2017-07-12 PROCEDURE — 80061 LIPID PANEL: CPT | Performed by: ADVANCED PRACTICE MIDWIFE

## 2017-07-12 PROCEDURE — 84443 ASSAY THYROID STIM HORMONE: CPT | Performed by: ADVANCED PRACTICE MIDWIFE

## 2017-07-12 PROCEDURE — 36415 COLL VENOUS BLD VENIPUNCTURE: CPT | Performed by: ADVANCED PRACTICE MIDWIFE

## 2017-07-13 LAB
CHOLEST SERPL-MCNC: 190 MG/DL
COPATH REPORT: NORMAL
HDLC SERPL-MCNC: 43 MG/DL
LDLC SERPL CALC-MCNC: 116 MG/DL
NONHDLC SERPL-MCNC: 147 MG/DL
PAP: NORMAL
TRIGL SERPL-MCNC: 153 MG/DL
TSH SERPL DL<=0.005 MIU/L-ACNC: 3.73 MU/L (ref 0.4–4)

## 2017-07-14 LAB
FINAL DIAGNOSIS: NORMAL
HPV HR 12 DNA CVX QL NAA+PROBE: NEGATIVE
HPV16 DNA SPEC QL NAA+PROBE: NEGATIVE
HPV18 DNA SPEC QL NAA+PROBE: NEGATIVE
SPECIMEN DESCRIPTION: NORMAL

## 2017-07-25 ENCOUNTER — OFFICE VISIT (OUTPATIENT)
Dept: OBGYN | Facility: CLINIC | Age: 52
End: 2017-07-25
Payer: COMMERCIAL

## 2017-07-25 VITALS — HEART RATE: 64 BPM | DIASTOLIC BLOOD PRESSURE: 64 MMHG | SYSTOLIC BLOOD PRESSURE: 112 MMHG | OXYGEN SATURATION: 98 %

## 2017-07-25 DIAGNOSIS — N95.1 MENOPAUSAL SYNDROME (HOT FLASHES): Primary | ICD-10-CM

## 2017-07-25 PROCEDURE — 99213 OFFICE O/P EST LOW 20 MIN: CPT | Performed by: ADVANCED PRACTICE MIDWIFE

## 2017-07-25 NOTE — NURSING NOTE
Chief Complaint   Patient presents with     Menopausal Sx     Discuss medication options   Initial /64 (BP Location: Right arm, Cuff Size: Adult Regular)  Pulse 64  LMP 04/11/2015  SpO2 98% Estimated body mass index is 28.21 kg/(m^2) as calculated from the following:    Height as of 7/11/17: 6' (1.829 m).    Weight as of 7/11/17: 208 lb (94.3 kg).  Medication Reconciliation: complete   Joanna Bergeron MA

## 2017-07-25 NOTE — MR AVS SNAPSHOT
After Visit Summary   7/25/2017    Christiana Hill    MRN: [id number]           Patient Information     Date Of Birth          1965        Visit Information        Provider Department      7/25/2017 2:30 PM Norma Garrett CNM [facility]        Today's Diagnoses     Menopausal syndrome (hot flashes)    -  1       Follow-ups after your visit        Follow-up notes from your care team     Return in about 6 weeks (around 9/5/2017) for Follow up HRT.      Your next 10 appointments already scheduled     Sep 05, 2017 10:00 AM CDT   Office Visit with Norma Garrett CNM   [facility] ([facility]    [address] [address] [phone]           Bring a current list of meds and any records pertaining to this visit.  For Physicals, please bring immunization records and any forms needing to be filled out.  Please arrive 10 minutes early to complete paperwork.              Who to contact     If you have questions or need follow up information about today's clinic visit or your schedule please contact [facility] directly at [phone].  Normal or non-critical lab and imaging results will be communicated to you by MyChart, letter or phone within 4 business days after the clinic has received the results. If you do not hear from us within 7 days, please contact the clinic through Vponhart or phone. If you have a critical or abnormal lab result, we will notify you by phone as soon as possible.  Submit refill requests through [email or web address] or call your pharmacy and they will forward the refill request to us. Please allow 3 business days for your refill to be completed.          Additional Information About Your Visit        MyChart Information     [email or web address] lets you send messages to your doctor, view your test results, renew your prescriptions, schedule appointments and more. To sign up, go to  [email or web address] . Click on \"Log in\" on the left side of the screen, which will take you to the Welcome page. Then click on \"Sign up Now\" on the right side of the page.     You will be asked to enter the access code listed below, as well as some personal information. Please follow the directions to create your username and password.     Your access code is: [email or web address]  Expires: 10/9/2017 12:30 PM     Your access code will  in 90 days. If you need help or a new code, please call your [place] clinic or [phone].        Care EveryWhere ID     This is your Care EveryWhere ID. This could be used by other organizations to access your [place] medical records  [id number]        Your Vitals Were     Pulse Last Period Pulse Oximetry             64 2015 98%          Blood Pressure from Last 3 Encounters:   17 112/64   17 102/58   17 116/76    Weight from Last 3 Encounters:   17 208 lb (94.3 kg)   17 212 lb 4.8 oz (96.3 kg)   17 215 lb (97.5 kg)              Today, you had the following     No orders found for display         Today's Medication Changes          These changes are accurate as of: 17  3:51 PM.  If you have any questions, ask your nurse or doctor.               Start taking these medicines.        Dose/Directions    Estradiol Acetate 0.05 MG/24HR Ring   Used for:  Menopausal syndrome (hot flashes)   Started by:  Norma Garrett CNM        Dose:  0.05 mg   Place 0.05 mg vaginally every 3 months   Quantity:  1 each   Refills:  0       progesterone 100 MG capsule   Commonly known as:  PROMETRIUM   Used for:  Menopausal syndrome (hot flashes)   Started by:  Norma Garrett CNM        Dose:  100 mg   Take 1 capsule (100 mg) by mouth daily   Quantity:  90 capsule   Refills:  0            Where to get your medicines      [facility] [address] [facility], [place] - [address] AT [facility] [address]  [address], [facility] [address]     Phone:  [phone]     Estradiol Acetate 0.05 MG/24HR Ring    progesterone 100 MG capsule                Primary Care Provider Office Phone # Fax #    Mara Rachel Rico PA-C [phone]       [place] [address]  [facility] [address]        Equal Access to Services     ALLIE FALCON : Hadii aad ku hadasho Soomaali, waaxda luqadaha, qaybta kaalmada adeegyada, waxay idiin hayaan adeeg khmasonsh lakathy . So [facility] [phone].    ATENCIÓN: Si tiffanie felix, tiene a coffman disposición servicios gratuitos de asistencia lingüística. Chris al [phone].    We comply with applicable federal civil rights laws and [place] laws. We do not discriminate on the basis of race, color, national origin, age, disability sex, sexual orientation or gender identity.            Thank you!     Thank you for choosing [facility]  for your care. Our goal is always to provide you with excellent care. Hearing back from our patients is one way we can continue to improve our services. Please take a few minutes to complete the written survey that you may receive in the mail after your visit with us. Thank you!             Your Updated Medication List - Protect others around you: Learn how to safely use, store and throw away your medicines at www.disposemymeds.org.          This list is accurate as of: 7/25/17  3:51 PM.  Always use your most recent med list.                   Brand Name Dispense Instructions for use Diagnosis    BIOTIN PO           CALCIUM PO      Take 2 capsules by mouth daily        Estradiol Acetate 0.05 MG/24HR Ring     1 each    Place 0.05 mg vaginally every 3 months    Menopausal syndrome (hot flashes)       FISH OIL PO      Take 2 capsules by mouth daily        methocarbamol 500 MG tablet    ROBAXIN    30 tablet    Take 2 tablets (1,000 mg) by mouth 3 times daily as needed for muscle spasms     Neck pain, Muscle spasms of neck       methylPREDNISolone 4 MG tablet    MEDROL DOSEPAK    21 tablet    Follow package instructions    DDD (degenerative disc disease), cervical       ONE-A-DAY MENOPAUSE HEALTH PO           ONE-A-DAY WOMENS 50 PLUS PO           oxyCODONE-acetaminophen 5-325 MG per tablet    PERCOCET    8 tablet    Take 1 tablet by mouth every 8 hours as needed for pain    Neck pain       progesterone 100 MG capsule    PROMETRIUM    90 capsule    Take 1 capsule (100 mg) by mouth daily    Menopausal syndrome (hot flashes)       VITAMIN D (CHOLECALCIFEROL) PO      Take by mouth daily

## 2017-07-25 NOTE — PROGRESS NOTES
SUBJECTIVE:                                                   Christiana Hill is a 51 year old who presents to clinic today for the following health issue(s):  Patient presents with:  Menopausal Sx      HPI:  Patient seen in clinic a few weeks ago for annual exam. She is postmenopausal x 2 years and having worsening hot flashes, vaginal dryness, insomnia and mood swings. Given written and verbal information at last visit and feels that she would like to try the vaginal ring for her symptoms.    Patient's last menstrual period was 2015.  Menstrual History: menopausal: at 49 years of age  Patient is sexually active  .  Using none for contraception.   Health maintenance updated:  yes  STI infx testing offered:  Declined    Last PHQ-9 score on record = No flowsheet data found.  Last GAD7 score on record = No flowsheet data found.      Problem list and histories reviewed & adjusted, as indicated.  Additional history: as documented.    Patient Active Problem List   Diagnosis     Status post tonsillectomy     Status post uvulopalatopharyngoplasty     ALPHONSO (obstructive sleep apnea)     History of colon cancer, no staging     CARDIOVASCULAR SCREENING; LDL GOAL LESS THAN 160     Abnormal kidney function study     Degeneration of cervical intervertebral disc     Facet arthropathy, cervical     Cervical spinal stenosis     Foraminal stenosis of cervical region     Menopausal syndrome (hot flashes)     Obesity     Ella (H)     Bipolar 1 disorder (H)     Past Surgical History:   Procedure Laterality Date      SECTION       ENT SURGERY      tonsils and uvula removed     HEMICOLECTOMY, RT/LT      right     LASIK       LIPOSUCTION (LOCATION)       MAMMOPLASTY AUGMENTATION       TUBAL LIGATION        Social History   Substance Use Topics     Smoking status: Never Smoker     Smokeless tobacco: Never Used     Alcohol use Yes      Comment: once a week      Problem (# of Occurrences) Relation (Name,Age of Onset)     Thyroid Disease (1) Mother       Negative family history of: Breast Cancer            Current Outpatient Prescriptions   Medication Sig     Multiple Vitamins-Minerals (ONE-A-DAY WOMENS 50 PLUS PO)      Estradiol Acetate 0.05 MG/24HR RING Place 0.05 mg vaginally every 3 months     progesterone (PROMETRIUM) 100 MG capsule Take 1 capsule (100 mg) by mouth daily     VITAMIN D, CHOLECALCIFEROL, PO Take by mouth daily     BIOTIN PO      Omega-3 Fatty Acids (FISH OIL PO) Take 2 capsules by mouth daily      CALCIUM PO Take 2 capsules by mouth daily      methylPREDNISolone (MEDROL DOSEPAK) 4 MG tablet Follow package instructions (Patient not taking: Reported on 7/11/2017)     methocarbamol (ROBAXIN) 500 MG tablet Take 2 tablets (1,000 mg) by mouth 3 times daily as needed for muscle spasms (Patient not taking: Reported on 7/11/2017)     oxyCODONE-acetaminophen (PERCOCET) 5-325 MG per tablet Take 1 tablet by mouth every 8 hours as needed for pain (Patient not taking: Reported on 7/11/2017)     Specialty Vitamins Products (ONE-A-DAY MENOPAUSE HEALTH PO)      No current facility-administered medications for this visit.      No Known Allergies    ROS:  C: NEGATIVE for fever, chills, change in weight       POSITIVE for insomnia  I: NEGATIVE for worrisome rashes, moles or lesions  E: NEGATIVE for vision changes or irritation  ENT: NEGATIVE for ear, mouth and throat problems  R: NEGATIVE for significant cough or SOB  B: NEGATIVE for masses, tenderness or discharge  CV: NEGATIVE for chest pain, palpitations or peripheral edema  GI: NEGATIVE for nausea, abdominal pain, heartburn, or change in bowel habits  : NEGATIVE for unusual urinary or vaginal symptoms. No vaginal bleeding.          POSITIVE for vaginal dryness  M: NEGATIVE for significant arthralgias or myalgia  N: NEGATIVE for weakness, dizziness or paresthesias  E: NEGATIVE for  skin/hair changes       POSITIVE for hot flashes  H: NEGATIVE for bleeding problems  P: NEGATIVE  for changes in mood or affect     OBJECTIVE:     /64 (BP Location: Right arm, Cuff Size: Adult Regular)  Pulse 64  LMP 04/11/2015  SpO2 98%  There is no height or weight on file to calculate BMI.    PHYSICAL EXAM:  Constitutional:  Appearance: Well nourished, well developed alert, in no acute distress  Chest:  Respiratory Effort:  Breathing unlabored  Neurologic/Psychiatric:  Mental Status:  Oriented X3      Patient had complete exam at previous visit.  In-Clinic Test Results:  No results found for this or any previous visit (from the past 24 hour(s)).    ASSESSMENT/PLAN:                                                        ICD-10-CM    1. Menopausal syndrome (hot flashes) N95.1 Estradiol Acetate 0.05 MG/24HR RING     progesterone (PROMETRIUM) 100 MG capsule       PLAN:        We discussed the risks and benefits of hormone replacement therapy and the results of the original Women's Health Initiative study and subsequent data.  Specifically, we discussed an increased risk of stroke, heart attack, and coronary artery disease, especially in patients with a personal history significant for chronic hypertension, hyperlipidemia, or strong family history of coronary artery disease. This is most significant for patients who have been in menopause for a long time, and less an issue for women who have recently entered menopause. We discussed increased risks of DVT and PE in estrogen-users, and that this risk may be lowest (but still increased) for transdermal estrogen users. We discussed current recommendations for using estrogen for symptomatic relief of menopausal symptoms in the lowest dose necessary for the shortest length of time needed. We discussed a small but slightly increased risk of breast cancer in women who take both estrogen and progesterone, and why progesterone is needed in women who retain their uterus. The risk of breast cancer is minimally increased, if at all, in the first five years of estrogen  and progesterone use. We also discussed other benefits including osteoporosis prevention and a decreased incidence of colon cancer, as well as quality of life issues. She has stated her understanding.    Patient started on the estrogen ring. Also to take progesterone as she has an uterus. Will see patient back for follow up in 4-6 weeks to check the status of her symptoms.     Encouraged patient to call with any questions or concerns.    15 minutes was spent face to face with the patient today discussing her history, diagnosis, and follow-up plan as noted above. Over 50% of the visit was spent in counseling and coordination of care.    Total Visit Time: 25 minutes.     SIRIA Rowley, IRIS

## 2017-09-01 ENCOUNTER — OFFICE VISIT (OUTPATIENT)
Dept: URGENT CARE | Facility: URGENT CARE | Age: 52
End: 2017-09-01
Payer: COMMERCIAL

## 2017-09-01 VITALS
SYSTOLIC BLOOD PRESSURE: 110 MMHG | HEART RATE: 62 BPM | OXYGEN SATURATION: 100 % | BODY MASS INDEX: 27.77 KG/M2 | WEIGHT: 205 LBS | TEMPERATURE: 98 F | DIASTOLIC BLOOD PRESSURE: 60 MMHG | HEIGHT: 72 IN

## 2017-09-01 DIAGNOSIS — M62.838 NECK MUSCLE SPASM: Primary | ICD-10-CM

## 2017-09-01 DIAGNOSIS — S29.012A UPPER BACK STRAIN, INITIAL ENCOUNTER: ICD-10-CM

## 2017-09-01 PROCEDURE — 99213 OFFICE O/P EST LOW 20 MIN: CPT | Performed by: FAMILY MEDICINE

## 2017-09-01 RX ORDER — HYDROCODONE BITARTRATE AND ACETAMINOPHEN 5; 325 MG/1; MG/1
1-2 TABLET ORAL EVERY 4 HOURS PRN
Qty: 10 TABLET | Refills: 0 | Status: SHIPPED | OUTPATIENT
Start: 2017-09-01 | End: 2017-10-02

## 2017-09-01 RX ORDER — METHOCARBAMOL 500 MG/1
1000 TABLET, FILM COATED ORAL 3 TIMES DAILY PRN
Qty: 30 TABLET | Refills: 0 | Status: SHIPPED | OUTPATIENT
Start: 2017-09-01 | End: 2017-10-02

## 2017-09-01 RX ORDER — METHYLPREDNISOLONE 4 MG
4 TABLET, DOSE PACK ORAL SEE ADMIN INSTRUCTIONS
Qty: 21 TABLET | Refills: 0 | Status: SHIPPED | OUTPATIENT
Start: 2017-09-01 | End: 2017-10-02

## 2017-09-01 NOTE — MR AVS SNAPSHOT
After Visit Summary   9/1/2017    Christiana Hill    MRN: [id number]           Patient Information     Date Of Birth          1965        Visit Information        Provider Department      9/1/2017 2:40 PM Trisha Torres MD [facility]        Today's Diagnoses     Neck muscle spasm    -  1      Care Instructions      Neck Spasm     A spasm of the neck muscles can happen after a sudden awkward neck movement. Sleeping with your neck in a crooked position can also cause spasm. Some people respond to emotional stress by tensing the muscles of their neck, shoulders, and upper back. If neck spasm lasts long enough, it can cause headache.  The treatment described below will usually help the pain to go away in 5 to 7 days. Pain that continues may need further evaluation or other types of treatment such as physical therapy.  Home care    Rest and relax the muscles. Use a comfortable pillow that supports the head and keeps the spine in a neutral position. The position of the head should not be tilted forward or backward. A rolled up towel may help for a custom fit.    Some people find relief with heat. Heat can be applied with either a warm shower or bath or a moist towel heated in the microwave and massage. Others prefer cold packs. You can make an ice pack by filling a plastic bag that seals at the top with ice cubes or crushed ice and then wrapping it with a thin towel. Try both and use the method that feels best for 15 to 20 minutes, several times a day.    Whether using ice or heat, be careful that you do not injure your skin. Never put ice directly on the skin. Always wrap the ice in a towel or other type of cloth. This is very important, especially in people with poor skin sensations.    Try to reduce your stress level. Emotional stress can lead to neck muscle tension and get in the way of or delay the healing process.    You may use over-the-counter pain medicine to  control pain, unless another medicine was prescribed.If you have chronic liver or kidney disease or ever had a stomach ulcer or GI bleeding, talk with your healthcare provider before using these medicines.  Follow-up care  Follow up with your healthcare provider if your symptoms do not show signs of improvement after one week. Physical therapy or further tests may be needed.  If X-rays, CT scans, or MRI scans were taken, you will be told of any new findings that may affect your care.  Call 911  Call 911 if you have:    Sudden weakness or numbness in one or both arms    Neck swelling, difficulty or painful swallowing    Difficulty breathing    Chest pain  When to seek medical advice  Call your healthcare provider right away if any of these occur:    Pain becomes worse or spreads into one or both arms    Increasing headache with nausea or vomiting    Fever of 100.4 F (38 C) or above lasting for 24 to 48 hours  Date Last Reviewed: 11/21/2015 2000-2017 [employer] [address] [place] [address]. All rights reserved. This information is not intended as a substitute for professional medical care. Always follow your healthcare professional's instructions.                Follow-ups after your visit        Your next 10 appointments already scheduled     Sep 11, 2017  1:00 PM CDT   Office Visit with Norma Garrett CNM   [facility]    [address] [address] [phone]           Bring a current list of meds and any records pertaining to this visit. For Physicals, please bring immunization records and any forms needing to be filled out. Please arrive 10 minutes early to complete paperwork.              Who to contact     If you have questions or need follow up information about today's clinic visit or your schedule please contact [facility] directly at [phone].  Normal or  "non-critical lab and imaging results will be communicated to you by MyChart, letter or phone within 4 business days after the clinic has received the results. If you do not hear from us within 7 days, please contact the clinic through [employer] or phone. If you have a critical or abnormal lab result, we will notify you by phone as soon as possible.  Submit refill requests through Sleep Number or call your pharmacy and they will forward the refill request to us. Please allow 3 business days for your refill to be completed.          Additional Information About Your Visit        Sleep Number Information     Sleep Number lets you send messages to your doctor, view your test results, renew your prescriptions, schedule appointments and more. To sign up, go to [email or web address] . Click on \"Log in\" on the left side of the screen, which will take you to the Welcome page. Then click on \"Sign up Now\" on the right side of the page.     You will be asked to enter the access code listed below, as well as some personal information. Please follow the directions to create your username and password.     Your access code is: [email or web address]  Expires: 10/9/2017 12:30 PM     Your access code will  in 90 days. If you need help or a new code, please call your [place] clinic or [phone].        Care EveryWhere ID     This is your Care EveryWhere ID. This could be used by other organizations to access your [place] medical records  [id number]        Your Vitals Were     Pulse Temperature Height Pulse Oximetry BMI (Body Mass Index)       62 98  F (36.7  C) 6' 1\" (1.854 m) 100% 27.05 kg/m2        Blood Pressure from Last 3 Encounters:   17 110/60   17 112/64   17 102/58    Weight from Last 3 Encounters:   17 205 lb (93 kg)   17 208 lb (94.3 kg)   17 212 lb 4.8 oz (96.3 kg)              Today, you had the following     No orders found for display         Today's Medication Changes          These changes are " accurate as of: 9/1/17  3:04 PM.  If you have any questions, ask your nurse or doctor.               Start taking these medicines.        Dose/Directions    HYDROcodone-acetaminophen 5-325 MG per tablet   Commonly known as:  NORCO   Used for:  Neck muscle spasm        Dose:  1-2 tablet   Take 1-2 tablets by mouth every 4 hours as needed for moderate to severe pain maximum 3 tablet(s) per day   Quantity:  10 tablet   Refills:  0         These medicines have changed or have updated prescriptions.        Dose/Directions    * methocarbamol 500 MG tablet   Commonly known as:  ROBAXIN   This may have changed:  Another medication with the same name was added. Make sure you understand how and when to take each.   Used for:  Neck pain, Muscle spasms of neck        Dose:  1000 mg   Take 2 tablets (1,000 mg) by mouth 3 times daily as needed for muscle spasms   Quantity:  30 tablet   Refills:  1       * methocarbamol 500 MG tablet   Commonly known as:  ROBAXIN   This may have changed:  You were already taking a medication with the same name, and this prescription was added. Make sure you understand how and when to take each.   Used for:  Neck muscle spasm        Dose:  1000 mg   Take 2 tablets (1,000 mg) by mouth 3 times daily as needed for muscle spasms   Quantity:  30 tablet   Refills:  0       * methylPREDNISolone 4 MG tablet   Commonly known as:  MEDROL DOSEPAK   This may have changed:  Another medication with the same name was added. Make sure you understand how and when to take each.   Used for:  DDD (degenerative disc disease), cervical        Follow package instructions   Quantity:  21 tablet   Refills:  0       * methylPREDNISolone 4 MG tablet   Commonly known as:  MEDROL   This may have changed:  You were already taking a medication with the same name, and this prescription was added. Make sure you understand how and when to take each.   Used for:  Neck muscle spasm        Dose:  4 mg   Take 1 tablet (4 mg) by mouth  See Admin Instructions follow package directions   Quantity:  21 tablet   Refills:  0       * Notice:  This list has 4 medication(s) that are the same as other medications prescribed for you. Read the directions carefully, and ask your doctor or other care provider to review them with you.         Where to get your medicines      These medications were sent to [id number] - [place], [place] - [address] AT [facility] [address]  [address], [facility] [address]     Phone:  [phone]     methocarbamol 500 MG tablet    methylPREDNISolone 4 MG tablet         Some of these will need a paper prescription and others can be bought over the counter.  Ask your nurse if you have questions.     Bring a paper prescription for each of these medications     HYDROcodone-acetaminophen 5-325 MG per tablet                Primary Care Provider Office Phone # Fax #    Mara Rachel Rico PA-C [phone] [address] [facility] [address]        Equal Access to Services     ALLIE FALCON AH: Hadii aad ku hadasho Soomaali, waaxda luqadaha, qaybta kaalmada adeegyada, waxay idiin hayaan adan mariano . So [facility] [phone].    ATENCIÓN: Si habla español, tiene a coffman disposición servicios gratuitos de asistencia lingüística. LlThe [facility] [phone].    We comply with applicable federal civil rights laws and [place] laws. We do not discriminate on the basis of race, color, national origin, age, disability sex, sexual orientation or gender identity.            Thank you!     Thank you for choosing [facility]  for your care. Our goal is always to provide you with excellent care. Hearing back from our patients is one way we can continue to improve our services. Please take a few minutes to complete the written survey that you may receive in the mail after your visit with us. Thank you!             Your Updated Medication List -  Protect others around you: Learn how to safely use, store and throw away your medicines at www.disposemymeds.org.          This list is accurate as of: 9/1/17  3:04 PM.  Always use your most recent med list.                   Brand Name Dispense Instructions for use Diagnosis    BIOTIN PO           CALCIUM PO      Take 2 capsules by mouth daily        Estradiol Acetate 0.05 MG/24HR Ring     1 each    Place 0.05 mg vaginally every 3 months    Menopausal syndrome (hot flashes)       FISH OIL PO      Take 2 capsules by mouth daily        HYDROcodone-acetaminophen 5-325 MG per tablet    NORCO    10 tablet    Take 1-2 tablets by mouth every 4 hours as needed for moderate to severe pain maximum 3 tablet(s) per day    Neck muscle spasm       * methocarbamol 500 MG tablet    ROBAXIN    30 tablet    Take 2 tablets (1,000 mg) by mouth 3 times daily as needed for muscle spasms    Neck pain, Muscle spasms of neck       * methocarbamol 500 MG tablet    ROBAXIN    30 tablet    Take 2 tablets (1,000 mg) by mouth 3 times daily as needed for muscle spasms    Neck muscle spasm       * methylPREDNISolone 4 MG tablet    MEDROL DOSEPAK    21 tablet    Follow package instructions    DDD (degenerative disc disease), cervical       * methylPREDNISolone 4 MG tablet    MEDROL    21 tablet    Take 1 tablet (4 mg) by mouth See Admin Instructions follow package directions    Neck muscle spasm       ONE-A-DAY MENOPAUSE HEALTH PO           ONE-A-DAY WOMENS 50 PLUS PO           oxyCODONE-acetaminophen 5-325 MG per tablet    PERCOCET    8 tablet    Take 1 tablet by mouth every 8 hours as needed for pain    Neck pain       progesterone 100 MG capsule    PROMETRIUM    90 capsule    Take 1 capsule (100 mg) by mouth daily    Menopausal syndrome (hot flashes)       VITAMIN D (CHOLECALCIFEROL) PO      Take by mouth daily        * Notice:  This list has 4 medication(s) that are the same as other medications prescribed for you. Read the directions  carefully, and ask your doctor or other care provider to review them with you.

## 2017-09-01 NOTE — PROGRESS NOTES
SUBJECTIVE:     Chief Complaint   Patient presents with     Back Pain     neck and upper back pain x 3 days     Christiana Hill is a 52 year old female who presents with a chief complaint of right neck/ upper back spasm with pain, tenderness and decreased range of motion for 3 days.  No injury    The patient complained of moderate pain  and has had decreased ROM.  Pain exacerbated by movement, twisting and flexion/extension.  Relieved by rest.  He treated it initially with heat and Ibuprofen. This is not the first time this type of injury has occurred to this patient.  She says she gets similar symptoms when she is exposed to cold winds (as had happened 3 days ago)  The pain does not produce radiating pain symptoms to the arms,  back , legs  there is no history of weakness , paralysis or other motor or sensory dysfunction of the extremities    Past Medical History:   Diagnosis Date     Bipolar 1 disorder (H) 5/12/2017     Cancer (H) 2007    colon     History of colon cancer, no staging 9/20/2013     History of sleep apnea 9/20/2013     ALPHONSO (obstructive sleep apnea) 9/20/2013     Status post tonsillectomy 9/20/2013     Status post uvulopalatopharyngoplasty 9/20/2013       ALLERGIES:  Review of patient's allergies indicates no known allergies.      Current Outpatient Prescriptions on File Prior to Visit:  Multiple Vitamins-Minerals (ONE-A-DAY WOMENS 50 PLUS PO)    VITAMIN D, CHOLECALCIFEROL, PO Take by mouth daily   BIOTIN PO    Omega-3 Fatty Acids (FISH OIL PO) Take 2 capsules by mouth daily    CALCIUM PO Take 2 capsules by mouth daily    Specialty Vitamins Products (ONE-A-DAY MENOPAUSE HEALTH PO)    Estradiol Acetate 0.05 MG/24HR RING Place 0.05 mg vaginally every 3 months (Patient not taking: Reported on 9/1/2017)   progesterone (PROMETRIUM) 100 MG capsule Take 1 capsule (100 mg) by mouth daily (Patient not taking: Reported on 9/1/2017)   methylPREDNISolone (MEDROL DOSEPAK) 4 MG tablet Follow package instructions  "(Patient not taking: Reported on 7/11/2017)   methocarbamol (ROBAXIN) 500 MG tablet Take 2 tablets (1,000 mg) by mouth 3 times daily as needed for muscle spasms (Patient not taking: Reported on 7/11/2017)   oxyCODONE-acetaminophen (PERCOCET) 5-325 MG per tablet Take 1 tablet by mouth every 8 hours as needed for pain (Patient not taking: Reported on 7/11/2017)     No current facility-administered medications on file prior to visit.     Social History   Substance Use Topics     Smoking status: Never Smoker     Smokeless tobacco: Never Used     Alcohol use Yes      Comment: once a week       Family History   Problem Relation Age of Onset     Thyroid Disease Mother      Breast Cancer No family hx of          ROS:  CONSTITUTIONAL:NEGATIVE for fever, chills, change in weight  INTEGUMENTARY/SKIN: NEGATIVE for worrisome rashes, moles or lesions  EYES: NEGATIVE for vision changes or irritation  ENT/MOUTH: NEGATIVE for ear, mouth and throat problems  RESP:NEGATIVE for significant cough or SOB  GI: NEGATIVE for nausea, abdominal pain, heartburn, or change in bowel habits    EXAM:   /60  Pulse 62  Temp 98  F (36.7  C)  Ht 6' 1\" (1.854 m)  Wt 205 lb (93 kg)  SpO2 100%  BMI 27.05 kg/m2  Gen: alert, mild distress and cooperative  Head: atraumatic, no contusions, no crepitus with palpation of the scalp, mandible or maxilla. Cranial nerves normal   Eyes:  PERRLA, EOMI, Ears: Normal TMs and canals, no bleeding or discharge.  Nose no bleeding or discharge,  Mouth no lacerations or lesions.   Neck:  tender to palpation of the right   cervical paraspinous muscles . There is not  midline bony pain  .    Mild limitation of ROM of the neck due to pain. Tenderness Right supraspinatous,  Right rhomboid muscle   Strength equal and symmetric in arms and legs , ambulation without difficulty.  There is not compromise to the distal circulation.  Pulses are +2 and CRT is brisk   NEURO: Normal strength and tone, sensory exam grossly " normal, mentation intact and speech normal      ASSESSMENT:   Neck muscle spasm     - methocarbamol (ROBAXIN) 500 MG tablet; Take 2 tablets (1,000 mg) by mouth 3 times daily as needed for muscle spasms  - methylPREDNISolone (MEDROL) 4 MG tablet; Take 1 tablet (4 mg) by mouth See Admin Instructions follow package directions  - HYDROcodone-acetaminophen (NORCO) 5-325 MG per tablet; Take 1-2 tablets by mouth every 4 hours as needed for moderate to severe pain maximum 3 tablet(s) per day    Upper back strain, initial encounter     May take ibuprofen and/ or acetaminophen prn for pain  Warm packs to improve local circulation to the area of the spasm  Perform gentle range of motion exercises to the neck / upper back

## 2017-09-01 NOTE — NURSING NOTE
"Chief Complaint   Patient presents with     Back Pain     neck and upper back pain x 3 days       Initial /60  Pulse 62  Temp 98  F (36.7  C)  Ht 6' 1\" (1.854 m)  Wt 205 lb (93 kg)  SpO2 100%  BMI 27.05 kg/m2 Estimated body mass index is 27.05 kg/(m^2) as calculated from the following:    Height as of this encounter: 6' 1\" (1.854 m).    Weight as of this encounter: 205 lb (93 kg).  Medication Reconciliation: complete     Arabella Maloney, MARILIN      "

## 2017-10-02 ENCOUNTER — OFFICE VISIT (OUTPATIENT)
Dept: OBGYN | Facility: CLINIC | Age: 52
End: 2017-10-02
Payer: COMMERCIAL

## 2017-10-02 VITALS
BODY MASS INDEX: 27.82 KG/M2 | SYSTOLIC BLOOD PRESSURE: 102 MMHG | DIASTOLIC BLOOD PRESSURE: 76 MMHG | HEIGHT: 72 IN | WEIGHT: 205.4 LBS

## 2017-10-02 DIAGNOSIS — N95.1 MENOPAUSAL SYNDROME (HOT FLASHES): Primary | ICD-10-CM

## 2017-10-02 PROCEDURE — 99213 OFFICE O/P EST LOW 20 MIN: CPT | Performed by: ADVANCED PRACTICE MIDWIFE

## 2017-10-02 RX ORDER — ESTRADIOL 1 MG/1
1 TABLET ORAL DAILY
Qty: 30 TABLET | Refills: 1 | Status: SHIPPED | OUTPATIENT
Start: 2017-10-02 | End: 2017-11-20

## 2017-10-02 NOTE — MR AVS SNAPSHOT
After Visit Summary   10/2/2017    Christiana Hill    MRN: [id number]           Patient Information     Date Of Birth          1965        Visit Information        Provider Department      10/2/2017 11:15 AM Norma Garrett CNM [facility]        Today's Diagnoses     Menopausal syndrome (hot flashes)    -  1      Care Instructions                  Menopausal Hormone Therapy  What is menopausal hormone therapy?   Menopausal hormone therapy is a treatment for the symptoms of menopause. Hormones are taken to replace some of the natural hormones that decrease at menopause. The 2 main female hormones are estrogen and progesterone.  Menopause is the time when a woman stops having menstrual periods. It usually starts gradually. The ovaries start making less hormone. Menstrual periods become less regular. After a time, periods stop completely.   Menopause happens suddenly if the ovaries are removed.  Menopause is usually part of a natural aging process. It is not a disease. For many women menopause is an easy change. Some women have problems caused by the decrease in hormones, particularly by the decrease in estrogen. These problems may be helped by treatment that replaces some of the lost hormone.  Taking estrogen alone increases the risk of cancer of the uterus. If you still have your uterus, treatment usually includes both estrogen and progesterone to reduce this risk. If your uterus has been removed, you may take estrogen alone.  Hormone replacement therapy (HRT) may be taken as:  tablets to be swallowed   patches or lotion to be put on the skin   a cream, ring, or tablet put into the vagina   pellets placed under the skin   shots  When is it used?   You may never have symptoms of menopause, or you may have them for a few weeks, for a few months, or sometimes over several years. Your symptoms may come and go. Your healthcare provider might recommend HRT to relieve the  following symptoms, especially if they are very severe:  hot flashes   night sweats   vaginal dryness, sometimes causing discomfort or pain during sex   trouble sleeping   loss of sex drive (libido)   mood swings   depression   memory loss   headaches  HRT may be prescribed to treat symptoms of menopause if:  Other treatments are not helping your symptoms enough.   You and your provider decide the benefits may outweigh the risks.   Factors such as your age, race, family history, and health history will be considered. You and your healthcare provider should discuss the risks and benefits of HRT for you.   Hormone therapy may be recommended for you if you have gone through menopause early (before the age of 40). HRT is often given to younger women who have had their ovaries removed.  What are the benefits of hormone therapy?   Relief of menopausal symptoms, including hot flashes and vaginal dryness   Prevention and treatment of osteoporosis. Osteoporosis is a weakening of the bones that starts around age 35 and makes it easier for the bones to break. However, lifestyle changes and other prescription medicines can also help prevent osteoporosis.   What are the risks of hormone therapy?   The risks of hormone therapy include:  Uterine cancer: Estrogen taken without progesterone increases the risk of cancer of the uterus. To lessen this risk, healthcare providers prescribe estrogen combined with progesterone for women who have not had their uterus removed.   Breast cancer: HRT may increase the risk of breast cancer. Talk to your healthcare provider about this risk. Many providers recommend that women be checked thoroughly for any tumors and have a mammogram before starting HRT. If you have a family history of breast cancer, be sure to discuss this with your provider.   Heart disease, stroke, and blood clots in the legs and lungs.  Hormone therapy may also increase your risk for some gallbladder problems and  dementia.  Researchers and healthcare providers are studying the benefits and risks of HRT and learning new things every day. The risks described above may be different for lower dosages of estrogen and progesterone or progesterone only.   What are the side effects of hormone therapy?   The side effects of hormone therapy may include:  bloating, fluid retention, and weight gain   breast tenderness and enlargement   nausea   symptoms like those of premenstrual tension (PMS), such as headaches and mood swings when progesterone is part of the treatment   abnormal blood clotting.   acne if you are taking estrogen with progesterone   headache   chloasma (tan blotches on your face).  If your treatment is with estrogen only, and you have your uterus, you may get some vaginal bleeding.   If your treatment includes both estrogen and progesterone, you may have some vaginal bleeding if there are days when you are not taking hormones. Not a menstrual period, the bleeding typically lasts 2 or 3 days. Usually you will not have any cramps with the bleeding.   If you take both estrogen and progesterone in low doses every day, the hormones will not cause bleeding except perhaps some spotting of blood for the first 2 to 3 months.  Tell your healthcare provider about any vaginal bleeding you have.  Who should not take hormone therapy?   Hormone therapy is not recommended for women who have any of these conditions, diseases, or medical history:  heart attack or stroke   high blood pressure you cannot control   blood clots in the legs, lungs, brain, or eyes   cancer of the breast or uterus   unexplained vaginal bleeding   liver disease  You should also not take hormones if you are pregnant or think you may be pregnant.  Also, if you smoke, you should avoid hormone therapy. Smoking while you are using this medicine increases the risk of serious side effects such as heart attack, stroke, and blood clots.   If you have any of the following  diseases or conditions, you should talk to your provider about the effect of HRT on these conditions:  uterine fibroids (These benign tumors may grow and bleed in response to estrogen. They begin to shrink at menopause unless you are taking estrogen.)   endometriosis   fibrocystic breast disease   migraine headaches   gallbladder disease   high blood pressure   porphyria (nerve pain or sensitivity to sunlight)  What can I do to take care of myself?   If you are thinking about taking hormones:  Talk to your healthcare provider about the risks and benefits.   Get a mammogram before you start HRT to check for breast cancer.   Ask your healthcare provider about:   the different types and dosages of hormone therapy   any side effects or special precautions you should know about while you are taking hormones   when you should start and stop taking the hormones  If you are already taking hormones:  Ask your healthcare provider about any special precautions or side effects.   If you are taking estrogen combined with progesterone, tell your provider if you have bleeding at any time other than the days when you do not take the hormones.   Don t change your dose without checking with your provider first.   Don t smoke.   Have a mammogram as often as your provider recommends.   Have a complete physical exam as often as your healthcare provider recommends. Your blood should be tested regularly for cholesterol levels and liver function.    Published by [employer].  This content is reviewed periodically and is subject to change as new health information becomes available. The information is intended to inform and educate and is not a replacement for medical evaluation, advice, diagnosis or treatment by a healthcare professional.            Follow-ups after your visit        Follow-up notes from your care team     Return if symptoms worsen or fail to improve.      Who to contact     If you have questions or need follow up  "information about today's clinic visit or your schedule please contact [facility] directly at [phone].  Normal or non-critical lab and imaging results will be communicated to you by [employer], letter or phone within 4 business days after the clinic has received the results. If you do not hear from us within 7 days, please contact the clinic through [employer] or phone. If you have a critical or abnormal lab result, we will notify you by phone as soon as possible.  Submit refill requests through [employer] or call your pharmacy and they will forward the refill request to us. Please allow 3 business days for your refill to be completed.          Additional Information About Your Visit        [employer] Information     [employer] gives you secure access to your electronic health record. If you see a primary care provider, you can also send messages to your care team and make appointments. If you have questions, please call your primary care clinic.  If you do not have a primary care provider, please call [phone] and they will assist you.        Care EveryWhere ID     This is your Care EveryWhere ID. This could be used by other organizations to access your [place] medical records  [id number]        Your Vitals Were     Height BMI (Body Mass Index)                6' 1\" (1.854 m) 27.1 kg/m2           Blood Pressure from Last 3 Encounters:   10/02/17 102/76   09/01/17 110/60   07/25/17 112/64    Weight from Last 3 Encounters:   10/02/17 205 lb 6.4 oz (93.2 kg)   09/01/17 205 lb (93 kg)   07/11/17 208 lb (94.3 kg)              Today, you had the following     No orders found for display         Today's Medication Changes          These changes are accurate as of: 10/2/17 12:15 PM.  If you have any questions, ask your nurse or doctor.               Start taking these medicines.        Dose/Directions    estradiol 1 MG tablet   Commonly known as:  ESTRACE   Used for:  Menopausal syndrome (hot flashes) "   Started by:  Norma Garrett CNM        Dose:  1 mg   Take 1 tablet (1 mg) by mouth daily   Quantity:  30 tablet   Refills:  1         These medicines have changed or have updated prescriptions.        Dose/Directions    * progesterone 100 MG capsule   Commonly known as:  PROMETRIUM   This may have changed:  Another medication with the same name was added. Make sure you understand how and when to take each.   Used for:  Menopausal syndrome (hot flashes)   Changed by:  Norma Garrett CNM        Dose:  100 mg   Take 1 capsule (100 mg) by mouth daily   Quantity:  90 capsule   Refills:  0       * progesterone 100 MG capsule   Commonly known as:  PROMETRIUM   This may have changed:  You were already taking a medication with the same name, and this prescription was added. Make sure you understand how and when to take each.   Used for:  Menopausal syndrome (hot flashes)   Changed by:  Norma Garrett CNM        Dose:  100 mg   Take 1 capsule (100 mg) by mouth daily   Quantity:  30 capsule   Refills:  1       * Notice:  This list has 2 medication(s) that are the same as other medications prescribed for you. Read the directions carefully, and ask your doctor or other care provider to review them with you.      Stop taking these medicines if you haven't already. Please contact your care team if you have questions.     HYDROcodone-acetaminophen 5-325 MG per tablet   Commonly known as:  NORCO   Stopped by:  Norma Garrett CNM           methocarbamol 500 MG tablet   Commonly known as:  ROBAXIN   Stopped by:  Norma Garrett CNM           methylPREDNISolone 4 MG tablet   Commonly known as:  MEDROL   Stopped by:  Norma Garrett CNM           ONE-A-DAY MENOPAUSE HEALTH PO   Stopped by:  Norma Garrett CNM           oxyCODONE-acetaminophen 5-325 MG per tablet   Commonly known as:  PERCOCET   Stopped by:  Norma Garrett CNM                Where to get your medicines      These medications were sent to [facility] - [place], [place] - [address] AT [facility] [address]  [address] [address]     Phone:  [phone]     estradiol 1 MG tablet    progesterone 100 MG capsule                Primary Care Provider Office Phone # Fax #    Mara Rachel Rico PA-C [phone] [address] [facility] [address]        Equal Access to Services     ALLIE FALCON : Hadii aad ku hadasho Soomaali, waaxda luqadaha, qaybta kaalmada adeegyada, waxay idiin hayaan adeeg kharahemal lakathy . So [facility] [phone].    ATENCIÓN: Si habla español, tiene a coffman disposición servicios gratuitos de asistencia lingüística. [facility] [phone].    We comply with applicable federal civil rights laws and [place] laws. We do not discriminate on the basis of race, color, national origin, age, disability, sex, sexual orientation, or gender identity.            Thank you!     Thank you for choosing [facility]  for your care. Our goal is always to provide you with excellent care. Hearing back from our patients is one way we can continue to improve our services. Please take a few minutes to complete the written survey that you may receive in the mail after your visit with us. Thank you!             Your Updated Medication List - Protect others around you: Learn how to safely use, store and throw away your medicines at www.disposemymeds.org.          This list is accurate as of: 10/2/17 12:15 PM.  Always use your most recent med list.                   Brand Name Dispense Instructions for use Diagnosis    BIOTIN PO           CALCIUM PO      Take 2 capsules by mouth daily        estradiol 1 MG tablet    ESTRACE    30 tablet    Take 1 tablet (1 mg) by mouth daily    Menopausal syndrome (hot flashes)       Estradiol Acetate 0.05 MG/24HR Ring     1 each    Place 0.05 mg vaginally every 3 months    Menopausal syndrome (hot flashes)       FISH OIL PO       Take 2 capsules by mouth daily        ONE-A-DAY WOMENS 50 PLUS PO           * progesterone 100 MG capsule    PROMETRIUM    90 capsule    Take 1 capsule (100 mg) by mouth daily    Menopausal syndrome (hot flashes)       * progesterone 100 MG capsule    PROMETRIUM    30 capsule    Take 1 capsule (100 mg) by mouth daily    Menopausal syndrome (hot flashes)       VITAMIN D (CHOLECALCIFEROL) PO      Take by mouth daily        * Notice:  This list has 2 medication(s) that are the same as other medications prescribed for you. Read the directions carefully, and ask your doctor or other care provider to review them with you.

## 2017-10-02 NOTE — PROGRESS NOTES
SUBJECTIVE:                                                   Christiana Hill is a 52 year old who presents to clinic today for the following health issue(s):  Patient presents with:  Recheck Medication      HPI:  Christiana did not start the Estring and progesterone after her 17 visit, as the medication was cost prohibitive. Has tried an OTC product with minimal effects. Continues to have multiple daily hot flashes and vaginal dryness. Is interested in another option.    Patient's last menstrual period was 2015.  Menstrual History: menopausal: at 49 years of age  Patient is sexually active  .  Using none for contraception.   Health maintenance updated:  yes  STI infx testing offered:  Declined     Problem list and histories reviewed & adjusted, as indicated.  Additional history: as documented.    Patient Active Problem List   Diagnosis     Status post tonsillectomy     Status post uvulopalatopharyngoplasty     ALPHONSO (obstructive sleep apnea)     History of colon cancer, no staging     CARDIOVASCULAR SCREENING; LDL GOAL LESS THAN 160     Abnormal kidney function study     Degeneration of cervical intervertebral disc     Facet arthropathy, cervical     Cervical spinal stenosis     Foraminal stenosis of cervical region     Menopausal syndrome (hot flashes)     Obesity     Ella (H)     Bipolar 1 disorder (H)     Past Surgical History:   Procedure Laterality Date      SECTION       ENT SURGERY      tonsils and uvula removed     HEMICOLECTOMY, RT/LT      right     LASIK       LIPOSUCTION (LOCATION)       MAMMOPLASTY AUGMENTATION       TUBAL LIGATION        Social History   Substance Use Topics     Smoking status: Never Smoker     Smokeless tobacco: Never Used     Alcohol use Yes      Comment: once a week      Problem (# of Occurrences) Relation (Name,Age of Onset)    Thyroid Disease (1) Mother       Negative family history of: Breast Cancer            Current Outpatient Prescriptions   Medication Sig  "    Multiple Vitamins-Minerals (ONE-A-DAY WOMENS 50 PLUS PO)      VITAMIN D, CHOLECALCIFEROL, PO Take by mouth daily     BIOTIN PO      Omega-3 Fatty Acids (FISH OIL PO) Take 2 capsules by mouth daily      CALCIUM PO Take 2 capsules by mouth daily      Estradiol Acetate 0.05 MG/24HR RING Place 0.05 mg vaginally every 3 months (Patient not taking: Reported on 9/1/2017)     progesterone (PROMETRIUM) 100 MG capsule Take 1 capsule (100 mg) by mouth daily (Patient not taking: Reported on 9/1/2017)     No current facility-administered medications for this visit.      No Known Allergies    ROS:  C: NEGATIVE for fever, chills, change in weight  B: NEGATIVE for masses, tenderness or discharge  CV: NEGATIVE for chest pain, palpitations or peripheral edema  GI: NEGATIVE for nausea, abdominal pain, heartburn, or change in bowel habits  : NEGATIVE for unusual urinary symptoms. No vaginal bleeding.         POSITIVE for vaginal dryness  M: NEGATIVE for significant arthralgias or myalgia  N: NEGATIVE for weakness, dizziness or paresthesias  E: NEGATIVE for skin/hair changes       POSITIVE for hot flashes  H: NEGATIVE for bleeding problems  P: NEGATIVE for changes in mood or affect     OBJECTIVE:     /76 (BP Location: Right arm, Patient Position: Chair, Cuff Size: Adult Regular)  Ht 6' 1\" (1.854 m)  Wt 205 lb 6.4 oz (93.2 kg)  BMI 27.1 kg/m2  Body mass index is 27.1 kg/(m^2).    PHYSICAL EXAM:  Constitutional:  Appearance: Well nourished, well developed alert, in no acute distress  Chest:  Respiratory Effort:  Breathing unlabored  Neurologic/Psychiatric:  Mental Status:  Oriented X3      In-Clinic Test Results:  No results found for this or any previous visit (from the past 24 hour(s)).    ASSESSMENT/PLAN:                                                        ICD-10-CM    1. Menopausal syndrome (hot flashes) N95.1        PLAN:    Switched patient to oral estrace and progesterone as this option was the most cost " effective.     We discussed the risks and benefits of hormone replacement therapy and the results of the original Women's Health Initiative study and subsequent data.  Specifically, we discussed an increased risk of stroke, heart attack, and coronary artery disease, especially in patients with a personal history significant for chronic hypertension, hyperlipidemia, or strong family history of coronary artery disease. This is most significant for patients who have been in menopause for a long time, and less an issue for women who have recently entered menopause. We discussed increased risks of DVT and PE in estrogen-users, and that this risk may be lowest (but still increased) for transdermal estrogen users. We discussed current recommendations for using estrogen for symptomatic relief of menopausal symptoms in the lowest dose necessary for the shortest length of time needed. We discussed a small but slightly increased risk of breast cancer in women who take both estrogen and progesterone, and why progesterone is needed in women who retain their uterus. The risk of breast cancer is minimally increased, if at all, in the first five years of estrogen and progesterone use. We also discussed other benefits including osteoporosis prevention and a decreased incidence of colon cancer, as well as quality of life issues. She has stated her understanding.    Patient will let me know how she is doing on the new regimen.    Encouraged patient to call with any questions or concerns.    15 minutes was spent face to face with the patient today discussing her history, diagnosis, and follow-up plan as noted above. Over 50% of the visit was spent in counseling and coordination of care.    Total Visit Time: 25 minutes.     SIRIA Rowley, IRIS

## 2017-10-02 NOTE — PATIENT INSTRUCTIONS
Menopausal Hormone Therapy  What is menopausal hormone therapy?   Menopausal hormone therapy is a treatment for the symptoms of menopause. Hormones are taken to replace some of the natural hormones that decrease at menopause. The 2 main female hormones are estrogen and progesterone.  Menopause is the time when a woman stops having menstrual periods. It usually starts gradually. The ovaries start making less hormone. Menstrual periods become less regular. After a time, periods stop completely.   Menopause happens suddenly if the ovaries are removed.  Menopause is usually part of a natural aging process. It is not a disease. For many women menopause is an easy change. Some women have problems caused by the decrease in hormones, particularly by the decrease in estrogen. These problems may be helped by treatment that replaces some of the lost hormone.  Taking estrogen alone increases the risk of cancer of the uterus. If you still have your uterus, treatment usually includes both estrogen and progesterone to reduce this risk. If your uterus has been removed, you may take estrogen alone.  Hormone replacement therapy (HRT) may be taken as:  tablets to be swallowed   patches or lotion to be put on the skin   a cream, ring, or tablet put into the vagina   pellets placed under the skin   shots  When is it used?   You may never have symptoms of menopause, or you may have them for a few weeks, for a few months, or sometimes over several years. Your symptoms may come and go. Your healthcare provider might recommend HRT to relieve the following symptoms, especially if they are very severe:  hot flashes   night sweats   vaginal dryness, sometimes causing discomfort or pain during sex   trouble sleeping   loss of sex drive (libido)   mood swings   depression   memory loss   headaches  HRT may be prescribed to treat symptoms of menopause if:  Other treatments are not helping your symptoms enough.   You and your  provider decide the benefits may outweigh the risks.   Factors such as your age, race, family history, and health history will be considered. You and your healthcare provider should discuss the risks and benefits of HRT for you.   Hormone therapy may be recommended for you if you have gone through menopause early (before the age of 40). HRT is often given to younger women who have had their ovaries removed.  What are the benefits of hormone therapy?   Relief of menopausal symptoms, including hot flashes and vaginal dryness   Prevention and treatment of osteoporosis. Osteoporosis is a weakening of the bones that starts around age 35 and makes it easier for the bones to break. However, lifestyle changes and other prescription medicines can also help prevent osteoporosis.   What are the risks of hormone therapy?   The risks of hormone therapy include:  Uterine cancer: Estrogen taken without progesterone increases the risk of cancer of the uterus. To lessen this risk, healthcare providers prescribe estrogen combined with progesterone for women who have not had their uterus removed.   Breast cancer: HRT may increase the risk of breast cancer. Talk to your healthcare provider about this risk. Many providers recommend that women be checked thoroughly for any tumors and have a mammogram before starting HRT. If you have a family history of breast cancer, be sure to discuss this with your provider.   Heart disease, stroke, and blood clots in the legs and lungs.  Hormone therapy may also increase your risk for some gallbladder problems and dementia.  Researchers and healthcare providers are studying the benefits and risks of HRT and learning new things every day. The risks described above may be different for lower dosages of estrogen and progesterone or progesterone only.   What are the side effects of hormone therapy?   The side effects of hormone therapy may include:  bloating, fluid retention, and weight gain   breast  tenderness and enlargement   nausea   symptoms like those of premenstrual tension (PMS), such as headaches and mood swings when progesterone is part of the treatment   abnormal blood clotting.   acne if you are taking estrogen with progesterone   headache   chloasma (tan blotches on your face).  If your treatment is with estrogen only, and you have your uterus, you may get some vaginal bleeding.   If your treatment includes both estrogen and progesterone, you may have some vaginal bleeding if there are days when you are not taking hormones. Not a menstrual period, the bleeding typically lasts 2 or 3 days. Usually you will not have any cramps with the bleeding.   If you take both estrogen and progesterone in low doses every day, the hormones will not cause bleeding except perhaps some spotting of blood for the first 2 to 3 months.  Tell your healthcare provider about any vaginal bleeding you have.  Who should not take hormone therapy?   Hormone therapy is not recommended for women who have any of these conditions, diseases, or medical history:  heart attack or stroke   high blood pressure you cannot control   blood clots in the legs, lungs, brain, or eyes   cancer of the breast or uterus   unexplained vaginal bleeding   liver disease  You should also not take hormones if you are pregnant or think you may be pregnant.  Also, if you smoke, you should avoid hormone therapy. Smoking while you are using this medicine increases the risk of serious side effects such as heart attack, stroke, and blood clots.   If you have any of the following diseases or conditions, you should talk to your provider about the effect of HRT on these conditions:  uterine fibroids (These benign tumors may grow and bleed in response to estrogen. They begin to shrink at menopause unless you are taking estrogen.)   endometriosis   fibrocystic breast disease   migraine headaches   gallbladder disease   high blood pressure   porphyria (nerve pain or  sensitivity to sunlight)  What can I do to take care of myself?   If you are thinking about taking hormones:  Talk to your healthcare provider about the risks and benefits.   Get a mammogram before you start HRT to check for breast cancer.   Ask your healthcare provider about:   the different types and dosages of hormone therapy   any side effects or special precautions you should know about while you are taking hormones   when you should start and stop taking the hormones  If you are already taking hormones:  Ask your healthcare provider about any special precautions or side effects.   If you are taking estrogen combined with progesterone, tell your provider if you have bleeding at any time other than the days when you do not take the hormones.   Don t change your dose without checking with your provider first.   Don t smoke.   Have a mammogram as often as your provider recommends.   Have a complete physical exam as often as your healthcare provider recommends. Your blood should be tested regularly for cholesterol levels and liver function.    Published by [employer].  This content is reviewed periodically and is subject to change as new health information becomes available. The information is intended to inform and educate and is not a replacement for medical evaluation, advice, diagnosis or treatment by a healthcare professional.

## 2017-10-02 NOTE — NURSING NOTE
"Chief Complaint   Patient presents with     Recheck Medication       Initial /76 (BP Location: Right arm, Patient Position: Chair, Cuff Size: Adult Regular)  Ht 6' 1\" (1.854 m)  Wt 205 lb 6.4 oz (93.2 kg)  BMI 27.1 kg/m2 Estimated body mass index is 27.1 kg/(m^2) as calculated from the following:    Height as of this encounter: 6' 1\" (1.854 m).    Weight as of this encounter: 205 lb 6.4 oz (93.2 kg).  BP completed using cuff size: regular        The following HM Due: NONE      The following patient reported/Care Every where data was sent to:  P ABSTRACT QUALITY INITIATIVES [[id number]]       patient has appointment for today             "

## 2017-11-17 ENCOUNTER — MYC MEDICAL ADVICE (OUTPATIENT)
Dept: OBGYN | Facility: CLINIC | Age: 52
End: 2017-11-17

## 2017-11-17 DIAGNOSIS — N95.1 MENOPAUSAL SYNDROME (HOT FLASHES): ICD-10-CM

## 2017-11-20 RX ORDER — ESTRADIOL 1 MG/1
1 TABLET ORAL DAILY
Qty: 90 TABLET | Refills: 3 | Status: SHIPPED | OUTPATIENT
Start: 2017-11-20 | End: 2019-10-16

## 2018-01-04 ENCOUNTER — TELEPHONE (OUTPATIENT)
Dept: FAMILY MEDICINE | Facility: CLINIC | Age: 53
End: 2018-01-04

## 2018-01-04 NOTE — TELEPHONE ENCOUNTER
"1/4/2018      Patient Communication Preferences indicate  Do not contact  and/or communication by \"Phone\" is not preferred. Call not required per Outreach team.          Outreach ,  Teena Barber    "

## 2018-04-10 ENCOUNTER — TELEPHONE (OUTPATIENT)
Dept: FAMILY MEDICINE | Facility: CLINIC | Age: 53
End: 2018-04-10

## 2018-04-10 NOTE — TELEPHONE ENCOUNTER
Panel Management Review      Patient has the following on her problem list: None      Composite cancer screening  Chart review shows that this patient is due/due soon for the following Mammogram  Summary:    Patient is due/failing the following:   MAMMOGRAM    Action needed:   Patient needs referral/order: mammo    Type of outreach:    Patient no longer comes here for mammography     Questions for provider review:    None                                                                                                                                    [facility]        Chart routed to none .

## 2018-11-26 ENCOUNTER — HEALTH MAINTENANCE LETTER (OUTPATIENT)
Age: 53
End: 2018-11-26

## 2018-11-29 ENCOUNTER — OFFICE VISIT (OUTPATIENT)
Dept: OBGYN | Facility: CLINIC | Age: 53
End: 2018-11-29
Payer: COMMERCIAL

## 2018-11-29 VITALS — DIASTOLIC BLOOD PRESSURE: 68 MMHG | BODY MASS INDEX: 30.15 KG/M2 | SYSTOLIC BLOOD PRESSURE: 104 MMHG | WEIGHT: 228.5 LBS

## 2018-11-29 DIAGNOSIS — N95.1 MENOPAUSAL SYNDROME (HOT FLASHES): Primary | ICD-10-CM

## 2018-11-29 PROCEDURE — 99213 OFFICE O/P EST LOW 20 MIN: CPT | Performed by: ADVANCED PRACTICE MIDWIFE

## 2018-11-29 RX ORDER — ESTRADIOL 1 MG/1
1 TABLET ORAL DAILY
Qty: 30 TABLET | Refills: 1 | Status: SHIPPED | OUTPATIENT
Start: 2018-11-29 | End: 2019-01-18

## 2018-11-29 NOTE — PATIENT INSTRUCTIONS
For Women: Deciding About Hormone Therapy (HT)    Is HT right for you? That s up to you and your healthcare provider. Your healthcare provider will review your health needs. Then he or she will suggest steps you can take to control any symptoms or health risks. HT may be one part of your overall program.  Deciding about HT is a personal choice. Take some time to weigh the pros and cons.  Some benefits of HT  Benefits include:    Helps prevent bone fractures    Decreased hot flashes and sweating    Less vaginal dryness    Fewer mood swings  Some risks of HT  Risks include:    Increased risk of uterine cancer if estrogen is taken alone (taking progestin along with estrogen eliminates this increase in risk)    Increased risk of breast cancer with long-term use    Increased risk of stroke and blood clots    Possible increased risk of dementia with hormone replacement therapy   Possible side effects  Side effects may include:    Breast tenderness    Fluid retention    Headache    Nausea    Bloating    Ongoing monthly bleeding or spotting (taking HT will not restore your fertility)  HT may not be for everyone  For some women, HT may not be a safe option. Tell your healthcare provider if you are pregnant. Also, mention if you have had any of the following:    Gallbladder disease    High blood pressure    Seizure disorders    High cholesterol    Liver disease    Migraine headaches    Breast or uterine cancer    Heart attack or confirmed heart disease    Blood-clotting problems    Phlebitis (inflammation of a vein)    Abnormal vaginal bleeding   Date Last Reviewed: 3/1/2017    7859-2329 [employer]. [address] [place] [address], [place] [address]. All rights reserved. This information is not intended as a substitute for professional medical care. Always follow your healthcare professional's instructions.

## 2018-11-29 NOTE — NURSING NOTE
"Chief Complaint   Patient presents with     Recheck Medication       Initial /68 (BP Location: Left arm, Cuff Size: Adult Large)  Wt 228 lb 8 oz (103.6 kg)  BMI 30.15 kg/m2 Estimated body mass index is 30.15 kg/(m^2) as calculated from the following:    Height as of 10/2/17: 6' 1\" (1.854 m).    Weight as of this encounter: 228 lb 8 oz (103.6 kg).  BP completed using cuff size: regular    Questioned patient about current smoking habits.  Pt. has never smoked.          The following HM Due: mammogram      Bryanna Ye CMA             "

## 2018-11-29 NOTE — MR AVS SNAPSHOT
After Visit Summary   11/29/2018    Christiana Hill    MRN: [id number]           Patient Information     Date Of Birth          1965        Visit Information        Provider Department      11/29/2018 2:30 PM Norma Garrett [facility]        Today's Diagnoses     Menopausal syndrome (hot flashes)    -  1      Care Instructions      For Women: Deciding About Hormone Therapy (HT)    Is HT right for you? That s up to you and your healthcare provider. Your healthcare provider will review your health needs. Then he or she will suggest steps you can take to control any symptoms or health risks. HT may be one part of your overall program.  Deciding about HT is a personal choice. Take some time to weigh the pros and cons.  Some benefits of HT  Benefits include:    Helps prevent bone fractures    Decreased hot flashes and sweating    Less vaginal dryness    Fewer mood swings  Some risks of HT  Risks include:    Increased risk of uterine cancer if estrogen is taken alone (taking progestin along with estrogen eliminates this increase in risk)    Increased risk of breast cancer with long-term use    Increased risk of stroke and blood clots    Possible increased risk of dementia with hormone replacement therapy   Possible side effects  Side effects may include:    Breast tenderness    Fluid retention    Headache    Nausea    Bloating    Ongoing monthly bleeding or spotting (taking HT will not restore your fertility)  HT may not be for everyone  For some women, HT may not be a safe option. Tell your healthcare provider if you are pregnant. Also, mention if you have had any of the following:    Gallbladder disease    High blood pressure    Seizure disorders    High cholesterol    Liver disease    Migraine headaches    Breast or uterine cancer    Heart attack or confirmed heart disease    Blood-clotting problems    Phlebitis (inflammation of a vein)    Abnormal vaginal bleeding   Date Last  Reviewed: 3/1/2017    6989-9694 [employer]. [address] [place] [address], [place] [address]. All rights reserved. This information is not intended as a substitute for professional medical care. Always follow your healthcare professional's instructions.                Follow-ups after your visit        Follow-up notes from your care team     Return in about 4 weeks (around 12/27/2018) for Physical Exam.      Who to contact     If you have questions or need follow up information about today's clinic visit or your schedule please contact [facility] directly at [phone].  Normal or non-critical lab and imaging results will be communicated to you by [employer], letter or phone within 4 business days after the clinic has received the results. If you do not hear from us within 7 days, please contact the clinic through [employer] or phone. If you have a critical or abnormal lab result, we will notify you by phone as soon as possible.  Submit refill requests through [employer] or call your pharmacy and they will forward the refill request to us. Please allow 3 business days for your refill to be completed.          Additional Information About Your Visit        MyChart Information     [employer] gives you secure access to your electronic health record. If you see a primary care provider, you can also send messages to your care team and make appointments. If you have questions, please call your primary care clinic.  If you do not have a primary care provider, please call [phone] and they will assist you.        Care EveryWhere ID     This is your Care EveryWhere ID. This could be used by other organizations to access your [facility] medical records  [id number]        Your Vitals Were     BMI (Body Mass Index)                   30.15 kg/m2            Blood Pressure from Last 3 Encounters:   11/29/18 104/68   10/02/17 102/76   09/01/17 110/60    Weight from Last 3 Encounters:   11/29/18 228 lb 8 oz (103.6  kg)   10/02/17 205 lb 6.4 oz (93.2 kg)   09/01/17 205 lb (93 kg)              Today, you had the following     No orders found for display         Today's Medication Changes          These changes are accurate as of 11/29/18  2:57 PM.  If you have any questions, ask your nurse or doctor.               These medicines have changed or have updated prescriptions.        Dose/Directions    * estradiol 1 MG tablet   Commonly known as:  ESTRACE   This may have changed:  Another medication with the same name was added. Make sure you understand how and when to take each.   Used for:  Menopausal syndrome (hot flashes)   Changed by:  Norma Garrett CNM        Dose:  1 mg   Take 1 tablet (1 mg) by mouth daily   Quantity:  90 tablet   Refills:  3       * estradiol 1 MG tablet   Commonly known as:  ESTRACE   This may have changed:  You were already taking a medication with the same name, and this prescription was added. Make sure you understand how and when to take each.   Used for:  Menopausal syndrome (hot flashes)   Changed by:  Norma Garrett CNM        Dose:  1 mg   Take 1 tablet (1 mg) by mouth daily   Quantity:  30 tablet   Refills:  1       * Notice:  This list has 2 medication(s) that are the same as other medications prescribed for you. Read the directions carefully, and ask your doctor or other care provider to review them with you.         Where to get your medicines      These medications were sent to [facility] [address] [facility] [address] AT [facility] [place]  [address], [facility] [address]     Phone:  [phone]     estradiol 1 MG tablet    progesterone 100 MG capsule                Primary Care Provider Office Phone # Fax #    Mara Rico PA-C [phone] [address] [facility] [address]        Equal Access to Services     ALLIE FALCON AH: Pravin Espitia, jose hi, stefania melo,  ezequiel jefferson adan miller'aan ah. So [facility] [phone].    ATENCIÓN: Si tiffanie felix, tiene a coffman disposición servicios gratuitos de asistencia lingüística. Chris reynolds [phone].    We comply with applicable federal civil rights laws and [place] laws. We do not discriminate on the basis of race, color, national origin, age, disability, sex, sexual orientation, or gender identity.            Thank you!     Thank you for choosing [facility]  for your care. Our goal is always to provide you with excellent care. Hearing back from our patients is one way we can continue to improve our services. Please take a few minutes to complete the written survey that you may receive in the mail after your visit with us. Thank you!             Your Updated Medication List - Protect others around you: Learn how to safely use, store and throw away your medicines at www.disposemymeds.org.          This list is accurate as of 11/29/18  2:57 PM.  Always use your most recent med list.                   Brand Name Dispense Instructions for use Diagnosis    BIOTIN PO           CALCIUM PO      Take 2 capsules by mouth daily        * estradiol 1 MG tablet    ESTRACE    90 tablet    Take 1 tablet (1 mg) by mouth daily    Menopausal syndrome (hot flashes)       * estradiol 1 MG tablet    ESTRACE    30 tablet    Take 1 tablet (1 mg) by mouth daily    Menopausal syndrome (hot flashes)       FISH OIL PO      Take 2 capsules by mouth daily        ONE-A-DAY WOMENS 50 PLUS PO           * progesterone 100 MG capsule    PROMETRIUM    90 capsule    Take 1 capsule (100 mg) by mouth daily    Menopausal syndrome (hot flashes)       * progesterone 100 MG capsule    PROMETRIUM    30 capsule    Take 1 capsule (100 mg) by mouth daily    Menopausal syndrome (hot flashes)       VITAMIN D (CHOLECALCIFEROL) PO      Take by mouth daily        * Notice:  This list has 4 medication(s) that are the same as other medications prescribed for  you. Read the directions carefully, and ask your doctor or other care provider to review them with you.

## 2018-11-29 NOTE — PROGRESS NOTES
SUBJECTIVE:                                                   Christiana Hill is a 53 year old who presents to clinic today for the following health issue(s):  Patient presents with:  Recheck Medication      HPI:  Christiana presents today for recheck of HRT. Patient is very happy. She is having much fewer hot flashes. She is sleeping better at night. She would like to continue on this medication. She denies any negative side effects.    No LMP recorded. Patient is not currently having periods (Reason: Irregular Periods).  Patient is not sexually active at this time  .  Using tubal ligation for contraception.   Health maintenance updated:  yes      Last PHQ-9 score on record = No flowsheet data found.  Last GAD7 score on record = No flowsheet data found.      Problem list and histories reviewed & adjusted, as indicated.  Additional history: as documented.    Patient Active Problem List   Diagnosis     Status post tonsillectomy     Status post uvulopalatopharyngoplasty     ALPHONSO (obstructive sleep apnea)     History of colon cancer, no staging     CARDIOVASCULAR SCREENING; LDL GOAL LESS THAN 160     Abnormal kidney function study     Degeneration of cervical intervertebral disc     Facet arthropathy, cervical     Cervical spinal stenosis     Foraminal stenosis of cervical region     Menopausal syndrome (hot flashes)     Obesity     Ella (H)     Bipolar 1 disorder (H)     Past Surgical History:   Procedure Laterality Date      SECTION       ENT SURGERY      tonsils and uvula removed     HEMICOLECTOMY, RT/LT      right     LASIK       LIPOSUCTION (LOCATION)       MAMMOPLASTY AUGMENTATION       TUBAL LIGATION        Social History   Substance Use Topics     Smoking status: Never Smoker     Smokeless tobacco: Never Used     Alcohol use Yes      Comment: once a week      Problem (# of Occurrences) Relation (Name,Age of Onset)    Thyroid Disease (1) Mother       Negative family history of: Breast Cancer             Current Outpatient Prescriptions   Medication Sig     BIOTIN PO      CALCIUM PO Take 2 capsules by mouth daily      estradiol (ESTRACE) 1 MG tablet Take 1 tablet (1 mg) by mouth daily     estradiol (ESTRACE) 1 MG tablet Take 1 tablet (1 mg) by mouth daily     Multiple Vitamins-Minerals (ONE-A-DAY WOMENS 50 PLUS PO)      Omega-3 Fatty Acids (FISH OIL PO) Take 2 capsules by mouth daily      progesterone (PROMETRIUM) 100 MG capsule Take 1 capsule (100 mg) by mouth daily     progesterone (PROMETRIUM) 100 MG capsule Take 1 capsule (100 mg) by mouth daily     VITAMIN D, CHOLECALCIFEROL, PO Take by mouth daily     No current facility-administered medications for this visit.      No Known Allergies    ROS:  CONSTITUTIONAL: NEGATIVE for fever, chills, change in weight  EYES: NEGATIVE for vision changes or irritation  BREAST: NEGATIVE for masses, tenderness or discharge  CV: NEGATIVE for chest pain, palpitations or peripheral edema  GI: NEGATIVE for nausea, abdominal pain, heartburn, or change in bowel habits  : NEGATIVE for unusual urinary or vaginal symptoms. No vaginal bleeding.  NEURO: NEGATIVE for weakness, dizziness or paresthesias  HEME/ALLERGY/IMMUNE: NEGATIVE for bleeding problems  PSYCHIATRIC: NEGATIVE for changes in mood or affect     OBJECTIVE:     /68 (BP Location: Left arm, Cuff Size: Adult Large)  Wt 228 lb 8 oz (103.6 kg)  BMI 30.15 kg/m2  Body mass index is 30.15 kg/(m^2).    PHYSICAL EXAM:  Constitutional:  Appearance: Well nourished, well developed alert, in no acute distress  Cardiovascular: Heart: Auscultation:  Regular rate, normal rhythm, no murmurs present  Neurologic/Psychiatric:  Mental Status:  Oriented X3      In-Clinic Test Results:  No results found for this or any previous visit (from the past 24 hour(s)).    ASSESSMENT/PLAN:                                                        ICD-10-CM    1. Menopausal syndrome (hot flashes) N95.1 estradiol (ESTRACE) 1 MG tablet      progesterone (PROMETRIUM) 100 MG capsule       PLAN:    We discussed the risks and benefits of hormone replacement therapy and the results of the original Women's Health Initiative study and subsequent data.  Specifically, we discussed an increased risk of stroke, heart attack, and coronary artery disease, especially in patients with a personal history significant for chronic hypertension, hyperlipidemia, or strong family history of coronary artery disease. This is most significant for patients who have been in menopause for a long time, and less an issue for women who have recently entered menopause. We discussed increased risks of DVT and PE in estrogen-users, and that this risk may be lowest (but still increased) for transdermal estrogen users. We discussed current recommendations for using estrogen for symptomatic relief of menopausal symptoms in the lowest dose necessary for the shortest length of time needed. We discussed a small but slightly increased risk of breast cancer in women who take both estrogen and progesterone, and why progesterone is needed in women who retain their uterus. The risk of breast cancer is minimally increased, if at all, in the first five years of estrogen and progesterone use. We also discussed other benefits including osteoporosis prevention and a decreased incidence of colon cancer, as well as quality of life issues. She has stated her understanding.    Advised patient to schedule appointment for annual exam some time in the next couple of months.       15 minutes was spent face to face with the patient today discussing her history, diagnosis, and follow-up plan as noted above. Over 50% of the visit was spent in counseling and coordination of care.    SIRIA Rowley, IRIS

## 2018-12-05 ENCOUNTER — OFFICE VISIT (OUTPATIENT)
Dept: OBGYN | Facility: CLINIC | Age: 53
End: 2018-12-05
Payer: COMMERCIAL

## 2018-12-05 VITALS
SYSTOLIC BLOOD PRESSURE: 102 MMHG | DIASTOLIC BLOOD PRESSURE: 70 MMHG | HEART RATE: 66 BPM | BODY MASS INDEX: 30.09 KG/M2 | WEIGHT: 228.1 LBS

## 2018-12-05 DIAGNOSIS — Z85.038 HISTORY OF COLON CANCER, NO STAGING: ICD-10-CM

## 2018-12-05 DIAGNOSIS — Z01.419 WOMEN'S ANNUAL ROUTINE GYNECOLOGICAL EXAMINATION: Primary | ICD-10-CM

## 2018-12-05 DIAGNOSIS — Z12.39 SCREENING BREAST EXAMINATION: ICD-10-CM

## 2018-12-05 PROCEDURE — 99396 PREV VISIT EST AGE 40-64: CPT | Performed by: ADVANCED PRACTICE MIDWIFE

## 2018-12-05 NOTE — MR AVS SNAPSHOT
After Visit Summary   12/5/2018    Christiana iHll    MRN: [id number]           Patient Information     Date Of Birth          1965        Visit Information        Provider Department      12/5/2018 2:00 PM Norma Garrett CNM [facility]        Today's Diagnoses     History of colon cancer, no staging    -  1    Screening breast examination          Care Instructions    Preventive Health Recommendations  Female Ages over age 50      Yearly exam:     See your health care provider every year in order to:    1.Review health changes.  2. Discuss preventive care.  3. Review your medicines if your provider has prescribed any      Get a Pap test every five years with co-testing for HPV (unless you have an abnormal result and your provider advises testing more often)       You do not need a Pap test if your uterus was removed (hysterectomy) and you have not had cancer      You should be tested each year for STIs (sexually transmitted diseases) if you are at risk    Have a mammogram every year     Have a colonoscopy at age 50, or have a yearly FIT test (stool test). These exams screen for colon cancer.  Screening typically occurs every 10 years or more often if you have a family history or significant risk factors    Have a cholesterol test every 3-5 years, or more often if advised    Have a diabetes test (fasting glucose) every three years. If you are at risk for diabetes, you should have this test more often       Screening for thyroid disease and vitamin D deficiency are also beneficial every 3-5 years and as needed      If you are at risk for osteoporosis (brittle bone disease), think about having a bone density scan (DEXA)      You may experience perimenopausal symptoms such as menstrual changes, hot flashes, night sweats, irritability, mood changes, vaginal dryness, and weight gain. Symptoms can be managed with lifestyle changes and/or medications for hormone replacement therapy  (HRT). Talk with your provider about HRT if you are interested. You are considered menopausal after one year without having a menstrual cycle.        Shots:     Get a flu shot each year. Get a tetanus shot every 10 years.          Nutrition:       Eat at least 5 servings of fruits and vegetables each day      Eat REAL food, stay away from processed food      Eat whole-grain bread, whole-wheat pasta and brown rice instead of white grains and rice      For bone health:  Eat calcium-rich foods or take calcium pills up to 1200 mg. Also take vitamin D (2000 IUs) each day.     Lifestyle      Exercise at least 30 minutes a day, 5 days a week. This will help you control your weight and prevent disease.      Include weight bearing exercise into your exercise routine to help decrease your risk for osteoporosis      Limit alcohol to one drink per day.      No smoking      Wear sunscreen to prevent skin cancer      See your dentist every six months to one year for an exam and cleaning      See your eye doctor every 1 to 2 years            Follow-ups after your visit        Additional Services     GASTROENTEROLOGY ADULT REF CONSULT ONLY       Preferred Location: [facility] [phone]      Please be aware that coverage of these services is subject to the terms and limitations of your health insurance plan.  Call member services at your health plan with any benefit or coverage questions.  Any procedures must be performed at a [place] facility OR coordinated by your clinic's referral office.    Please bring the following with you to your appointment:    (1) Any X-Rays, CTs or MRIs which have been performed.  Contact the facility where they were done to arrange for  prior to your scheduled appointment.    (2) List of current medications   (3) This referral request   (4) Any documents/labs given to you for this referral                  Follow-up notes from your care team     Return in about 1 year (around 12/5/2019) for  Physical Exam.      Future tests that were ordered for you today     Open Future Orders        Priority Expected Expires Ordered    *MA Screening Digital Bilateral Routine  12/5/2019 12/5/2018            Who to contact     If you have questions or need follow up information about today's clinic visit or your schedule please contact [facility] directly at [phone].  Normal or non-critical lab and imaging results will be communicated to you by MyChart, letter or phone within 4 business days after the clinic has received the results. If you do not hear from us within 7 days, please contact the clinic through [facility] or phone. If you have a critical or abnormal lab result, we will notify you by phone as soon as possible.  Submit refill requests through [facility] or call your pharmacy and they will forward the refill request to us. Please allow 3 business days for your refill to be completed.          Additional Information About Your Visit        MyChart Information     ISH gives you secure access to your electronic health record. If you see a primary care provider, you can also send messages to your care team and make appointments. If you have questions, please call your primary care clinic.  If you do not have a primary care provider, please call [phone] and they will assist you.        Care EveryWhere ID     This is your Care EveryWhere ID. This could be used by other organizations to access your [place] medical records  [id number]        Your Vitals Were     Pulse Breastfeeding? BMI (Body Mass Index)             66 No 30.09 kg/m2          Blood Pressure from Last 3 Encounters:   12/05/18 102/70   11/29/18 104/68   10/02/17 102/76    Weight from Last 3 Encounters:   12/05/18 228 lb 1.6 oz (103.5 kg)   11/29/18 228 lb 8 oz (103.6 kg)   10/02/17 205 lb 6.4 oz (93.2 kg)              We Performed the Following     GASTROENTEROLOGY ADULT REF CONSULT ONLY        Primary Care Provider Office  Phone # Fax #    Mara COLEMAN Rico PA-C [phone] [address] [facility] [address]        Equal Access to Services     ALLIE FALCON : Hadii aad ku hadsamianithin Espitia, waisisda luarronjose manuelha, qaybta kanicoleda kameron, ezequiel jefferson herlindahiram tafoya laGalindodori logan. So [facility] [phone].    ATENCIÓN: Si habla español, tiene a coffman disposición servicios gratuitos de asistencia lingüística. Llame al [phone].    We comply with applicable federal civil rights laws and [place] laws. We do not discriminate on the basis of race, color, national origin, age, disability, sex, sexual orientation, or gender identity.            Thank you!     Thank you for choosing [facility]  for your care. Our goal is always to provide you with excellent care. Hearing back from our patients is one way we can continue to improve our services. Please take a few minutes to complete the written survey that you may receive in the mail after your visit with us. Thank you!             Your Updated Medication List - Protect others around you: Learn how to safely use, store and throw away your medicines at www.disposemymeds.org.          This list is accurate as of 12/5/18  3:00 PM.  Always use your most recent med list.                   Brand Name Dispense Instructions for use Diagnosis    BIOTIN PO           CALCIUM PO      Take 2 capsules by mouth daily        * estradiol 1 MG tablet    ESTRACE    90 tablet    Take 1 tablet (1 mg) by mouth daily    Menopausal syndrome (hot flashes)       * estradiol 1 MG tablet    ESTRACE    30 tablet    Take 1 tablet (1 mg) by mouth daily    Menopausal syndrome (hot flashes)       FISH OIL PO      Take 2 capsules by mouth daily        ONE-A-DAY WOMENS 50 PLUS PO           * progesterone 100 MG capsule    PROMETRIUM    90 capsule    Take 1 capsule (100 mg) by mouth daily    Menopausal syndrome (hot flashes)       * progesterone 100 MG capsule    PROMETRIUM    30 capsule    Take  1 capsule (100 mg) by mouth daily    Menopausal syndrome (hot flashes)       VITAMIN D (CHOLECALCIFEROL) PO      Take by mouth daily        * Notice:  This list has 4 medication(s) that are the same as other medications prescribed for you. Read the directions carefully, and ask your doctor or other care provider to review them with you.

## 2018-12-05 NOTE — NURSING NOTE
"Chief Complaint   Patient presents with     Physical     no pap due       Initial /70 (BP Location: Left arm, Patient Position: Chair, Cuff Size: Adult Large)  Pulse 66  Wt 228 lb 1.6 oz (103.5 kg)  Breastfeeding? No  BMI 30.09 kg/m2 Estimated body mass index is 30.09 kg/(m^2) as calculated from the following:    Height as of 10/2/17: 6' 1\" (1.854 m).    Weight as of this encounter: 228 lb 1.6 oz (103.5 kg).  BP completed using cuff size: large    Questioned patient about current smoking habits.  Pt. has never smoked.          The following HM Due: mammogram  colonoscopy/FIT      Mya Alvarez CMA on 2018 at 2:18 PM       "
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
## 2018-12-07 ENCOUNTER — HOSPITAL ENCOUNTER (OUTPATIENT)
Dept: MAMMOGRAPHY | Facility: CLINIC | Age: 53
Discharge: HOME | End: 2018-12-07
Attending: ADVANCED PRACTICE MIDWIFE | Admitting: ADVANCED PRACTICE MIDWIFE
Payer: COMMERCIAL

## 2018-12-07 DIAGNOSIS — Z12.39 SCREENING BREAST EXAMINATION: ICD-10-CM

## 2018-12-07 PROCEDURE — 77067 SCR MAMMO BI INCL CAD: CPT

## 2019-01-18 DIAGNOSIS — N95.1 MENOPAUSAL SYNDROME (HOT FLASHES): ICD-10-CM

## 2019-01-18 RX ORDER — ESTRADIOL 1 MG/1
1 TABLET ORAL DAILY
Qty: 30 TABLET | Refills: 3 | Status: SHIPPED | OUTPATIENT
Start: 2019-01-18 | End: 2019-05-09

## 2019-01-18 NOTE — TELEPHONE ENCOUNTER
progesterone      Last Written Prescription Date:  11/29/18  Last Fill Quantity: 30,   # refills: 1  Last Office Visit: 12/5/18  Future Office visit:

## 2019-01-18 NOTE — TELEPHONE ENCOUNTER
estradiol      Last Written Prescription Date:  11/29/18  Last Fill Quantity: 30,   # refills: 1  Last Office Visit: 12/5/18  Future Office visit:

## 2019-01-18 NOTE — TELEPHONE ENCOUNTER
"Requested Prescriptions   Pending Prescriptions Disp Refills     progesterone (PROMETRIUM) 100 MG capsule 30 capsule 1     Sig: Take 1 capsule (100 mg) by mouth daily    Hormone Replacement Therapy Passed - 1/18/2019  2:55 PM       Passed - Blood pressure under 140/90 in past 12 months    BP Readings from Last 3 Encounters:   12/05/18 102/70   11/29/18 104/68   10/02/17 102/76                Passed - Recent (12 mo) or future (30 days) visit within the authorizing provider's specialty    Patient had office visit in the last 12 months or has a visit in the next 30 days with authorizing provider or within the authorizing provider's specialty.  See \"Patient Info\" tab in inbasket, or \"Choose Columns\" in Meds & Orders section of the refill encounter.             Passed - Patient has mammogram in past 2 years on file if age 50-75       Passed - Medication is active on med list       Passed - Patient is 18 years of age or older       Passed - No active pregnancy on record       Passed - No positive pregnancy test on record in past 12 months        rx approved per [facility] protocol.      Arabella Phoenix RN    "

## 2019-01-18 NOTE — TELEPHONE ENCOUNTER
"Requested Prescriptions   Pending Prescriptions Disp Refills     estradiol (ESTRACE) 1 MG tablet 30 tablet 1     Sig: Take 1 tablet (1 mg) by mouth daily    Hormone Replacement Therapy Passed - 1/18/2019  1:23 PM       Passed - Blood pressure under 140/90 in past 12 months    BP Readings from Last 3 Encounters:   12/05/18 102/70   11/29/18 104/68   10/02/17 102/76                Passed - Recent (12 mo) or future (30 days) visit within the authorizing provider's specialty    Patient had office visit in the last 12 months or has a visit in the next 30 days with authorizing provider or within the authorizing provider's specialty.  See \"Patient Info\" tab in inbasket, or \"Choose Columns\" in Meds & Orders section of the refill encounter.             Passed - Patient has mammogram in past 2 years on file if age 50-75       Passed - Medication is active on med list       Passed - Patient is 18 years of age or older       Passed - No active pregnancy on record       Passed - No positive pregnancy test on record in past 12 months        rx approved per [facility] protocol.      Arabella Phoenix RN    "

## 2019-05-09 DIAGNOSIS — N95.1 MENOPAUSAL SYNDROME (HOT FLASHES): ICD-10-CM

## 2019-05-09 RX ORDER — ESTRADIOL 1 MG/1
1 TABLET ORAL DAILY
Qty: 30 TABLET | Refills: 3 | Status: SHIPPED | OUTPATIENT
Start: 2019-05-09 | End: 2019-08-23

## 2019-05-09 NOTE — TELEPHONE ENCOUNTER
"Requested Prescriptions   Pending Prescriptions Disp Refills     estradiol (ESTRACE) 1 MG tablet 30 tablet 3     Sig: Take 1 tablet (1 mg) by mouth daily       Hormone Replacement Therapy Passed - 5/9/2019 10:18 AM        Passed - Blood pressure under 140/90 in past 12 months     BP Readings from Last 3 Encounters:   12/05/18 102/70   11/29/18 104/68   10/02/17 102/76                 Passed - Recent (12 mo) or future (30 days) visit within the authorizing provider's specialty     Patient had office visit in the last 12 months or has a visit in the next 30 days with authorizing provider or within the authorizing provider's specialty.  See \"Patient Info\" tab in inbasket, or \"Choose Columns\" in Meds & Orders section of the refill encounter.              Passed - Patient has mammogram in past 2 years on file if age 50-75        Passed - Medication is active on med list        Passed - Patient is 18 years of age or older        Passed - No active pregnancy on record        Passed - No positive pregnancy test on record in past 12 months        rx approved per [facility] protocol.      Arabella Phoenix RN    "

## 2019-05-09 NOTE — TELEPHONE ENCOUNTER
estradiol      Last Written Prescription Date:  1/18/19  Last Fill Quantity: 30,   # refills: 3  Last Office Visit: 12/5/18  Future Office visit:

## 2019-05-09 NOTE — TELEPHONE ENCOUNTER
progesterone      Last Written Prescription Date:  1/18/19  Last Fill Quantity: 30 ,   # refills: 3  Last Office Visit: 12/5/18  Future Office visit:

## 2019-05-09 NOTE — TELEPHONE ENCOUNTER
"Requested Prescriptions   Pending Prescriptions Disp Refills     progesterone (PROMETRIUM) 100 MG capsule 30 capsule 3     Sig: Take 1 capsule (100 mg) by mouth daily       Hormone Replacement Therapy Passed - 5/9/2019 10:21 AM        Passed - Blood pressure under 140/90 in past 12 months     BP Readings from Last 3 Encounters:   12/05/18 102/70   11/29/18 104/68   10/02/17 102/76                 Passed - Recent (12 mo) or future (30 days) visit within the authorizing provider's specialty     Patient had office visit in the last 12 months or has a visit in the next 30 days with authorizing provider or within the authorizing provider's specialty.  See \"Patient Info\" tab in inbasket, or \"Choose Columns\" in Meds & Orders section of the refill encounter.              Passed - Patient has mammogram in past 2 years on file if age 50-75        Passed - Medication is active on med list        Passed - Patient is 18 years of age or older        Passed - No active pregnancy on record        Passed - No positive pregnancy test on record in past 12 months        rx approved per [facility] protocol.      Arabella Phoenix RN    "

## 2019-05-15 ENCOUNTER — OFFICE VISIT (OUTPATIENT)
Dept: URGENT CARE | Facility: URGENT CARE | Age: 54
End: 2019-05-15
Payer: COMMERCIAL

## 2019-05-15 VITALS
WEIGHT: 227 LBS | DIASTOLIC BLOOD PRESSURE: 86 MMHG | OXYGEN SATURATION: 99 % | TEMPERATURE: 98 F | BODY MASS INDEX: 29.95 KG/M2 | SYSTOLIC BLOOD PRESSURE: 112 MMHG | HEART RATE: 74 BPM

## 2019-05-15 DIAGNOSIS — M54.12 CERVICAL RADICULOPATHY: Primary | ICD-10-CM

## 2019-05-15 DIAGNOSIS — M62.838 MUSCLE SPASM: ICD-10-CM

## 2019-05-15 PROCEDURE — 99214 OFFICE O/P EST MOD 30 MIN: CPT | Performed by: PHYSICIAN ASSISTANT

## 2019-05-15 RX ORDER — METHYLPREDNISOLONE 4 MG
TABLET, DOSE PACK ORAL
Qty: 21 TABLET | Refills: 0 | Status: SHIPPED | OUTPATIENT
Start: 2019-05-15 | End: 2019-10-31

## 2019-05-15 RX ORDER — METHOCARBAMOL 750 MG/1
750 TABLET, FILM COATED ORAL 4 TIMES DAILY PRN
Qty: 40 TABLET | Refills: 0 | Status: SHIPPED | OUTPATIENT
Start: 2019-05-15 | End: 2019-10-31

## 2019-05-15 NOTE — PROGRESS NOTES
Patient presents with:  Pain: neck on left side of neck, pain now down the arm and into shoulder blade 8/10 pain   Urgent Care    SUBJECTIVE:  Chief Complaint   Patient presents with     Pain     neck on left side of neck, pain now down the arm and into shoulder blade 8/10 pain      Urgent Care     Christiana Hill is a 53 year old female presents with a chief complaint of left sided upper back and neck pain for the past couple of weeks, gradually worsening.    Doing a lot of lifting as she is moving her mother.    Tried ibuprofen and a muscle relaxant earlier today with little relief.    Denies any cough, runny nose or fever.    Some tingling in her left upper arm.  NO chest pain or shortness of breath.           Past Medical History:   Diagnosis Date     Bipolar 1 disorder (H) 5/12/2017     Cancer (H) 2007    colon     History of colon cancer, no staging 9/20/2013     History of sleep apnea 9/20/2013     ALPHONSO (obstructive sleep apnea) 9/20/2013     Status post tonsillectomy 9/20/2013     Status post uvulopalatopharyngoplasty 9/20/2013     Patient Active Problem List   Diagnosis     Status post tonsillectomy     Status post uvulopalatopharyngoplasty     ALPHONSO (obstructive sleep apnea)     History of colon cancer, no staging     CARDIOVASCULAR SCREENING; LDL GOAL LESS THAN 160     Abnormal kidney function study     Degeneration of cervical intervertebral disc     Facet arthropathy, cervical     Cervical spinal stenosis     Foraminal stenosis of cervical region     Menopausal syndrome (hot flashes)     Obesity     Ella (H)     Bipolar 1 disorder (H)     Social History     Tobacco Use     Smoking status: Never Smoker     Smokeless tobacco: Never Used   Substance Use Topics     Alcohol use: Yes     Comment: once a week       ROS:  CONSTITUTIONAL:NEGATIVE for fever, chills, change in weight  INTEGUMENTARY/SKIN: NEGATIVE for worrisome rashes, moles or lesions  ENT/MOUTH: NEGATIVE for ear, mouth and throat  problems  RESP:NEGATIVE for significant cough or SOB  CV: NEGATIVE for chest pain, palpitations or peripheral edema  GI: NEGATIVE for nausea, abdominal pain, heartburn, or change in bowel habits  : negative  MUSCULOSKELETAL: as per HPI  NEURO: NEGATIVE for weakness, dizziness or paresthesias  Review of systems negative except as stated above.    EXAM:   /86   Pulse 74   Temp 98  F (36.7  C) (Oral)   Wt 103 kg (227 lb)   SpO2 99%   BMI 29.95 kg/m    Gen: healthy,alert,no distress  NECK: no vertebral tenderness.    Upper back: no vertebral tenderness.   Left arm: non tender.  ROM is decreased secondary to pain in upper back/neck.     There is not compromise to the distal circulation.  Pulses are +2 and CRT is brisk  GENERAL APPEARANCE: healthy, alert and no distress  SKIN: no suspicious lesions or rashes  NEURO: Normal strength and tone, sensory exam grossly normal, mentation intact and speech normal    (M54.12) Cervical radiculopathy  (primary encounter diagnosis)  Comment:   Plan: methylPREDNISolone (MEDROL DOSEPAK) 4 MG tablet        therapy pack, acetaminophen-codeine (TYLENOL         #3) 300-30 MG tablet            (M62.838) Muscle spasm  Comment:   Plan: methocarbamol (ROBAXIN) 750 MG tablet          Use codeine with caution    Gentle stretches followed by ice to area over a thin cloth 3-4 times a day    Follow up with primary clinic should symptoms persist or worsen.      Patient expresses understanding and agreement with the assessment and plan as above.

## 2019-05-15 NOTE — PATIENT INSTRUCTIONS
(M54.12) Cervical radiculopathy  (primary encounter diagnosis)  Comment:   Plan: methylPREDNISolone (MEDROL DOSEPAK) 4 MG tablet        therapy pack, acetaminophen-codeine (TYLENOL         #3) 300-30 MG tablet            (M62.838) Muscle spasm  Comment:   Plan: methocarbamol (ROBAXIN) 750 MG tablet          Gentle stretches followed by ice to area over a thin cloth 3-4 times a day    Follow up with primary clinic should symptoms persist or worsen.

## 2019-08-13 ENCOUNTER — TRANSFERRED RECORDS (OUTPATIENT)
Dept: HEALTH INFORMATION MANAGEMENT | Facility: CLINIC | Age: 54
End: 2019-08-13

## 2019-08-23 DIAGNOSIS — N95.1 MENOPAUSAL SYNDROME (HOT FLASHES): ICD-10-CM

## 2019-08-23 RX ORDER — ESTRADIOL 1 MG/1
1 TABLET ORAL DAILY
Qty: 30 TABLET | Refills: 3 | Status: SHIPPED | OUTPATIENT
Start: 2019-08-23 | End: 2019-10-31

## 2019-08-23 NOTE — TELEPHONE ENCOUNTER
Prescription approved per [facility] Refill Protocol.  Arianne JACOBS RN

## 2019-08-23 NOTE — TELEPHONE ENCOUNTER
estradiol      Last Written Prescription Date:  5/9/19  Last Fill Quantity: 30,   # refills: 3  Last Office Visit: 12/5/18  Future Office visit:

## 2019-08-23 NOTE — TELEPHONE ENCOUNTER
progesterone      Last Written Prescription Date:  5/9/19  Last Fill Quantity: 30,   # refills: 3  Last Office Visit: 12/5/18  Future Office visit:

## 2019-08-26 ENCOUNTER — TRANSFERRED RECORDS (OUTPATIENT)
Dept: HEALTH INFORMATION MANAGEMENT | Facility: CLINIC | Age: 54
End: 2019-08-26

## 2019-10-16 ENCOUNTER — OFFICE VISIT (OUTPATIENT)
Dept: OBGYN | Facility: CLINIC | Age: 54
End: 2019-10-16
Payer: COMMERCIAL

## 2019-10-16 VITALS — WEIGHT: 228 LBS | SYSTOLIC BLOOD PRESSURE: 98 MMHG | BODY MASS INDEX: 30.08 KG/M2 | DIASTOLIC BLOOD PRESSURE: 70 MMHG

## 2019-10-16 DIAGNOSIS — Z12.39 SCREENING BREAST EXAMINATION: ICD-10-CM

## 2019-10-16 DIAGNOSIS — Z01.419 WOMEN'S ANNUAL ROUTINE GYNECOLOGICAL EXAMINATION: Primary | ICD-10-CM

## 2019-10-16 DIAGNOSIS — Z11.3 SCREEN FOR STD (SEXUALLY TRANSMITTED DISEASE): ICD-10-CM

## 2019-10-16 DIAGNOSIS — N95.1 MENOPAUSAL SYNDROME (HOT FLASHES): ICD-10-CM

## 2019-10-16 DIAGNOSIS — N95.0 POSTMENOPAUSAL BLEEDING: ICD-10-CM

## 2019-10-16 PROCEDURE — 87591 N.GONORRHOEAE DNA AMP PROB: CPT | Performed by: ADVANCED PRACTICE MIDWIFE

## 2019-10-16 PROCEDURE — 87491 CHLMYD TRACH DNA AMP PROBE: CPT | Performed by: ADVANCED PRACTICE MIDWIFE

## 2019-10-16 PROCEDURE — 99396 PREV VISIT EST AGE 40-64: CPT | Performed by: ADVANCED PRACTICE MIDWIFE

## 2019-10-16 PROCEDURE — 99213 OFFICE O/P EST LOW 20 MIN: CPT | Mod: 25 | Performed by: ADVANCED PRACTICE MIDWIFE

## 2019-10-16 RX ORDER — ESTRADIOL 1 MG/1
1 TABLET ORAL DAILY
Qty: 90 TABLET | Refills: 3 | Status: SHIPPED | OUTPATIENT
Start: 2019-10-16 | End: 2020-10-12

## 2019-10-16 NOTE — PROGRESS NOTES
Christiana is a 54 year old  female who presents for annual exam.     Besides routine health maintenance,  she would like to discuss refilling her prescription for HRT.  HPI:  Christiana presents today for her annual visit. She has noted some intermittent vaginal bleeding over the past few months. She has been on HRT since 2017 for postmenopausal hot flashes and vaginal dryness.  LMP: 2015        REPRODUCTIVE/GYNECOLOGIC HISTORY:  Christiana is sexually active with male partner(s) and is currently in monogamous relationship x 10 months.   STI testing offered?  Accepted  History of abnormal Pap smear:  No  SOCIAL HISTORY  Abuse: does not report having previously been physical or sexually abused.    Do you feel safe in your environment? NO    She  reports that she has never smoked. She has never used smokeless tobacco.      Last PHQ-9 score on record = No flowsheet data found.  Last GAD7 score on record = No flowsheet data found.  Alcohol Score = Once a month    HEALTH MAINTENANCE:  Cholesterol: (  Cholesterol   Date Value Ref Range Status   2017 190 <200 mg/dL Final   10/16/2013 195 0 - 200 mg/dL Final     Comment:     LDL Cholesterol is the primary guide to therapy.   The NCEP recommends further evaluation of: patients with cholesterol greater   than 200 mg/dL if additional risk factors are present, cholesterol greater   than   240 mg/dL, triglycerides greater than 150 mg/dL, or HDL less than 40 mg/dL.    History of abnormal lipids: No  Mammo: 2018 . History of abnormal Mammo: No.  Regular Self Breast Exams: Yes  TSH: (  TSH   Date Value Ref Range Status   2017 3.73 0.40 - 4.00 mU/L Final    )  Pap; (  Lab Results   Component Value Date    PAP NIL 2017    PAP OTHER-NIL, See Result 10/16/2013    )  Immunizations up to date: no  (Gardasil, Tdap, Flu)  Health maintenance updated:  no    HEALTHY HABITS  Eating habits: follows a balanced nutrition diet  Calcium/Vitamin D intake: source:  dairy Adequate?    Exercise: How often do you exercise? None  Have you had an eye exam in the last two years? NO  Do you routinely see the dentist once or twice yearly? YES         HISTORY:  OB History    Para Term  AB Living   1 1 1 0 0 0   SAB TAB Ectopic Multiple Live Births   0 0 0 0 0      # Outcome Date GA Lbr Emmanuel/2nd Weight Sex Delivery Anes PTL Lv   1 Term               Obstetric Comments   Child passed away at 9 months from severe chromosomal abnormalities     Past Medical History:   Diagnosis Date     Bipolar 1 disorder (H) 2017     Cancer (H)     colon     History of colon cancer, no staging 2013     History of sleep apnea 2013     ALPHONSO (obstructive sleep apnea) 2013     Status post tonsillectomy 2013     Status post uvulopalatopharyngoplasty 2013     Past Surgical History:   Procedure Laterality Date      SECTION       ENT SURGERY      tonsils and uvula removed     HEMICOLECTOMY, RT/LT      right     LASIK       LIPOSUCTION (LOCATION)       MAMMOPLASTY AUGMENTATION       TUBAL LIGATION       Family History   Problem Relation Age of Onset     Thyroid Disease Mother      Breast Cancer No family hx of      Social History     Socioeconomic History     Marital status:      Spouse name: None     Number of children: None     Years of education: None     Highest education level: None   Occupational History     Occupation:  school psychologist     Employer: SELF   Social Needs     Financial resource strain: None     Food insecurity:     Worry: None     Inability: None     Transportation needs:     Medical: None     Non-medical: None   Tobacco Use     Smoking status: Never Smoker     Smokeless tobacco: Never Used   Substance and Sexual Activity     Alcohol use: Yes     Comment: once a week     Drug use: No     Sexual activity: Yes     Partners: Male     Birth control/protection: Surgical   Lifestyle     Physical activity:     Days per week: None     Minutes per  session: None     Stress: None   Relationships     Social connections:     Talks on phone: None     Gets together: None     Attends Sabianist service: None     Active member of club or organization: None     Attends meetings of clubs or organizations: None     Relationship status: None     Intimate partner violence:     Fear of current or ex partner: None     Emotionally abused: None     Physically abused: None     Forced sexual activity: None   Other Topics Concern      Service No     Blood Transfusions No     Caffeine Concern No     Occupational Exposure Yes     Hobby Hazards No     Sleep Concern No     Stress Concern No     Weight Concern No     Special Diet Yes     Comment: no red meat     Back Care Not Asked     Exercise Yes     Bike Helmet No     Seat Belt Yes     Self-Exams Yes     Parent/sibling w/ CABG, MI or angioplasty before 65F 55M? Not Asked   Social History Narrative    ** Merged History Encounter **         Pt is recently  8/2013 and has 3 teen step-children.         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        Current Outpatient Medications:      BIOTIN PO, , Disp: , Rfl:      CALCIUM PO, Take 2 capsules by mouth daily , Disp: , Rfl:      estradiol (ESTRACE) 1 MG tablet, Take 1 tablet (1 mg) by mouth daily, Disp: 30 tablet, Rfl: 3     estradiol (ESTRACE) 1 MG tablet, Take 1 tablet (1 mg) by mouth daily, Disp: 90 tablet, Rfl: 3     Multiple Vitamins-Minerals (ONE-A-DAY WOMENS 50 PLUS PO), , Disp: , Rfl:      Omega-3 Fatty Acids (FISH OIL PO), Take 2 capsules by mouth daily , Disp: , Rfl:      progesterone (PROMETRIUM) 100 MG capsule, Take 1 capsule (100 mg) by mouth daily, Disp: 30 capsule, Rfl: 3     progesterone (PROMETRIUM) 100 MG capsule, Take 1 capsule (100 mg) by mouth daily, Disp: 90 capsule, Rfl: 3     VITAMIN D, CHOLECALCIFEROL, PO, Take by mouth daily, Disp: , Rfl:      methocarbamol (ROBAXIN) 750 MG tablet, Take 1 tablet (750 mg) by mouth 4  times daily as needed (Patient not taking: Reported on 10/16/2019), Disp: 40 tablet, Rfl: 0     methylPREDNISolone (MEDROL DOSEPAK) 4 MG tablet therapy pack, Follow Package Directions (Patient not taking: Reported on 10/16/2019), Disp: 21 tablet, Rfl: 0   No Known Allergies    Past medical, surgical, social and family history were reviewed and updated in [facility].    ROS:   CONSTITUTIONAL: NEGATIVE for fever, chills, change in weight  INTEGUMENTARY/SKIN: NEGATIVE for worrisome rashes, moles or lesions  EYES: NEGATIVE for vision changes or irritation  ENT: NEGATIVE for ear, mouth and throat problems  RESP: NEGATIVE for significant cough or SOB  BREAST: NEGATIVE for masses, tenderness or discharge  CV: NEGATIVE for chest pain, palpitations or peripheral edema  GI: NEGATIVE for nausea, abdominal pain, heartburn, or change in bowel habits  : NEGATIVE for unusual urinary or vaginal symptoms.   POSITIVE for intermittent vaginal spotting  MUSCULOSKELETAL: NEGATIVE for significant arthralgias or myalgia  NEURO: NEGATIVE for weakness, dizziness or paresthesias  ENDOCRINE: NEGATIVE for temperature intolerance, skin/hair changes  HEME/ALLERGY/IMMUNE: NEGATIVE for bleeding problems  PSYCHIATRIC: NEGATIVE for changes in mood or affect     PHYSICAL EXAM:  BP 98/70 (BP Location: Right arm, Cuff Size: Adult Large)   Wt 103.4 kg (228 lb)   BMI 30.08 kg/m     BMI: Body mass index is 30.08 kg/m .  Constitutional: healthy, alert and no distress  Neck: symmetrical, thyroid normal size, no masses present, no lymphadenopathy present.   Cardiovascular: RRR, no murmurs present  Respiratory: breathing unlabored, lungs CTA bilaterally  Breast:normal without masses, tenderness or nipple discharge and no palpable axillary masses or adenopathy  Gastrointestinal: abdomen soft, non-tender, bowel sounds present  PELVIC EXAM:  Vulva: No lesions, no adenopathy, BUS WNL  Vagina: Moist, pink, discharge normal  well rugated, no lesions  Cervix:smooth,  no  lesions  Uterus: Normal size, non-tender, mobile  Ovaries: No masses palpated  Rectal exam: deferred    ASSESSMENT/PLAN:      ICD-10-CM    1. Women's annual routine gynecological examination Z01.419 NEISSERIA GONORRHOEA PCR     CHLAMYDIA TRACHOMATIS PCR   2. Postmenopausal bleeding N95.0 US Pelvic Complete w Transvaginal   3. Menopausal syndrome (hot flashes) N95.1 estradiol (ESTRACE) 1 MG tablet     progesterone (PROMETRIUM) 100 MG capsule   4. Screening breast examination Z12.39 *MA Screening Digital Bilateral   5. Screen for STD (sexually transmitted disease) Z11.3      1. Exam normal and appropriate for age. Next annual exam in one year.    2. US pending. Discussed possible etiology including side effect from HRT, endometrial thickening, or infection.    3. Refilled HRT. Patient plans to try to decrease estrogen dose. Discussed taper plan.  Continue progesterone until completely stopping estrogen.    4. Breast exam normal. Mammogram pending.    5. Labs pending. Will advise patient of results when available.    COUNSELING:   Reviewed preventive health counseling, as reflected in patient instructions       Regular exercise       Healthy diet/nutrition       (Caryn)menopause management   reports that she has never smoked. She has never used smokeless tobacco.      SIRIA Rowley, ANGELIQUEM

## 2019-10-16 NOTE — NURSING NOTE
"Chief Complaint   Patient presents with     Physical     renew meds       Initial BP 98/70 (BP Location: Right arm, Cuff Size: Adult Large)   Wt 103.4 kg (228 lb)   BMI 30.08 kg/m   Estimated body mass index is 30.08 kg/m  as calculated from the following:    Height as of 10/2/17: 1.854 m (6' 1\").    Weight as of this encounter: 103.4 kg (228 lb).  BP completed using cuff size: large    Questioned patient about current smoking habits.  Pt. has never smoked.        Erin Ye CMA         "

## 2019-10-17 LAB
C TRACH DNA SPEC QL NAA+PROBE: NEGATIVE
N GONORRHOEA DNA SPEC QL NAA+PROBE: NEGATIVE
SPECIMEN SOURCE: NORMAL
SPECIMEN SOURCE: NORMAL

## 2019-10-18 ENCOUNTER — ANCILLARY PROCEDURE (OUTPATIENT)
Dept: ULTRASOUND IMAGING | Facility: CLINIC | Age: 54
End: 2019-10-18
Attending: ADVANCED PRACTICE MIDWIFE
Payer: COMMERCIAL

## 2019-10-18 DIAGNOSIS — N95.0 POSTMENOPAUSAL BLEEDING: ICD-10-CM

## 2019-10-18 PROCEDURE — 76856 US EXAM PELVIC COMPLETE: CPT | Performed by: OBSTETRICS & GYNECOLOGY

## 2019-10-18 PROCEDURE — 76830 TRANSVAGINAL US NON-OB: CPT | Performed by: OBSTETRICS & GYNECOLOGY

## 2019-10-22 ENCOUNTER — TELEPHONE (OUTPATIENT)
Dept: OBGYN | Facility: CLINIC | Age: 54
End: 2019-10-22

## 2019-10-22 NOTE — TELEPHONE ENCOUNTER
Tried to call patient regarding ultrasound results that indicate that her endometrial lining is quite thickened. Left message for patient to call us back. She will need to be scheduled for an endometrial biopsy.      SIRIA Rowley, CNM

## 2019-10-31 ENCOUNTER — OFFICE VISIT (OUTPATIENT)
Dept: OBGYN | Facility: CLINIC | Age: 54
End: 2019-10-31
Payer: COMMERCIAL

## 2019-10-31 VITALS — SYSTOLIC BLOOD PRESSURE: 100 MMHG | BODY MASS INDEX: 30.71 KG/M2 | DIASTOLIC BLOOD PRESSURE: 70 MMHG | WEIGHT: 232.8 LBS

## 2019-10-31 DIAGNOSIS — R93.89 THICKENED ENDOMETRIUM: Primary | ICD-10-CM

## 2019-10-31 PROCEDURE — 58100 BIOPSY OF UTERUS LINING: CPT | Performed by: OBSTETRICS & GYNECOLOGY

## 2019-10-31 PROCEDURE — 88305 TISSUE EXAM BY PATHOLOGIST: CPT | Performed by: OBSTETRICS & GYNECOLOGY

## 2019-10-31 NOTE — PROGRESS NOTES
INDICATIONS:                                                    Is a pregnancy test required: No.  Was a consent obtained?  Yes    Having endometrial biopsy for post-menopausal bleeding and thickended endometrium  Patient has been on continuous combined HRT since 2017  Today's PHQ-2 Score:   PHQ-2 ( 1999 Pf[employer]) 11/29/2018   Q1: Little interest or pleasure in doing things 0   Q2: Feeling down, depressed or hopeless 0   PHQ-2 Score 0       PROCEDURE;                                                      A speculum was placed in the vagina and cervix prepped with betadine. A tenaculum was not attached to the cervix. A small plastic 5 mm Pipelle syringe curette was inserted into the cervical canal. The uterus was sounded to 9 cms. A vigorous four quadrant biopsy was performed, removing amount moderate  of tissue. The speculum was removed. This tissue was placed in Formalin and sent to pathology.    The patient tolerated the procedure  well and she reported there was  cramping.      POST PROCEDURE;                                                      There  was no cramping at the time of discharge. She  tolerated the procedure well. There were no complications. Patient was discharged in stable condition.    Patient advised to call the clinic if severe pelvic pain, fever or heavy bleeding.    Bandar Araujo MD

## 2019-11-04 LAB — COPATH REPORT: NORMAL

## 2019-11-08 ENCOUNTER — HEALTH MAINTENANCE LETTER (OUTPATIENT)
Age: 54
End: 2019-11-08

## 2020-02-05 ENCOUNTER — OFFICE VISIT (OUTPATIENT)
Dept: URGENT CARE | Facility: URGENT CARE | Age: 55
End: 2020-02-05
Payer: COMMERCIAL

## 2020-02-05 VITALS
HEART RATE: 91 BPM | OXYGEN SATURATION: 99 % | BODY MASS INDEX: 30.61 KG/M2 | DIASTOLIC BLOOD PRESSURE: 72 MMHG | TEMPERATURE: 98.7 F | WEIGHT: 232 LBS | SYSTOLIC BLOOD PRESSURE: 120 MMHG | RESPIRATION RATE: 16 BRPM

## 2020-02-05 DIAGNOSIS — N39.0 ACUTE UTI: Primary | ICD-10-CM

## 2020-02-05 DIAGNOSIS — R30.0 DYSURIA: ICD-10-CM

## 2020-02-05 DIAGNOSIS — R82.90 NONSPECIFIC FINDING ON EXAMINATION OF URINE: ICD-10-CM

## 2020-02-05 LAB
ALBUMIN UR-MCNC: >=300 MG/DL
APPEARANCE UR: ABNORMAL
BACTERIA #/AREA URNS HPF: ABNORMAL /HPF
BILIRUB UR QL STRIP: ABNORMAL
COLOR UR AUTO: ABNORMAL
GLUCOSE UR STRIP-MCNC: NEGATIVE MG/DL
HGB UR QL STRIP: ABNORMAL
KETONES UR STRIP-MCNC: ABNORMAL MG/DL
LEUKOCYTE ESTERASE UR QL STRIP: ABNORMAL
NITRATE UR QL: POSITIVE
NON-SQ EPI CELLS #/AREA URNS LPF: ABNORMAL /LPF
PH UR STRIP: 5 PH (ref 5–7)
RBC #/AREA URNS AUTO: >100 /HPF
SOURCE: ABNORMAL
SP GR UR STRIP: 1.01 (ref 1–1.03)
UROBILINOGEN UR STRIP-ACNC: 1 EU/DL (ref 0.2–1)
WBC #/AREA URNS AUTO: >100 /HPF

## 2020-02-05 PROCEDURE — 99213 OFFICE O/P EST LOW 20 MIN: CPT | Performed by: PHYSICIAN ASSISTANT

## 2020-02-05 PROCEDURE — 87186 SC STD MICRODIL/AGAR DIL: CPT | Performed by: PHYSICIAN ASSISTANT

## 2020-02-05 PROCEDURE — 87088 URINE BACTERIA CULTURE: CPT | Performed by: PHYSICIAN ASSISTANT

## 2020-02-05 PROCEDURE — 87086 URINE CULTURE/COLONY COUNT: CPT | Performed by: PHYSICIAN ASSISTANT

## 2020-02-05 PROCEDURE — 81001 URINALYSIS AUTO W/SCOPE: CPT | Performed by: PHYSICIAN ASSISTANT

## 2020-02-05 RX ORDER — PHENAZOPYRIDINE HYDROCHLORIDE 200 MG/1
200 TABLET, FILM COATED ORAL 3 TIMES DAILY PRN
Qty: 12 TABLET | Refills: 0 | Status: SHIPPED | OUTPATIENT
Start: 2020-02-05 | End: 2022-03-02

## 2020-02-05 RX ORDER — CEFDINIR 300 MG/1
300 CAPSULE ORAL 2 TIMES DAILY
Qty: 20 CAPSULE | Refills: 0 | Status: SHIPPED | OUTPATIENT
Start: 2020-02-05 | End: 2020-02-15

## 2020-02-05 NOTE — PROGRESS NOTES
Patient presents with:  UTI: urinary urgency, blood (clot) in urine, dysuria since last night     SUBJECTIVE:   Christiana Hill is a 54 year old female who  presents today for a possible UTI. Symptoms of dysuria, urgency, frequency have been going on for since last night with progressively worsening hematuria today.      She drove to [place] for work today and the drive was challenging as she had to stop frequently to urinate.      Denies any abdominal pain or flank pain or fevers.    This patient does have a history of urinary tract infections. Patient denies long duration, rigors, flank pain, temperature > 101 degrees F. and Vomiting, significant nausea or diarrhea or vaginal discharge     Past Medical History:   Diagnosis Date     Bipolar 1 disorder (H) 5/12/2017     Cancer (H) 2007    colon     History of colon cancer, no staging 9/20/2013     History of sleep apnea 9/20/2013     ALPHONSO (obstructive sleep apnea) 9/20/2013     Status post tonsillectomy 9/20/2013     Status post uvulopalatopharyngoplasty 9/20/2013     Patient Active Problem List   Diagnosis     Status post tonsillectomy     Status post uvulopalatopharyngoplasty     ALPHONSO (obstructive sleep apnea)     History of colon cancer, no staging     CARDIOVASCULAR SCREENING; LDL GOAL LESS THAN 160     Abnormal kidney function study     Degeneration of cervical intervertebral disc     Facet arthropathy, cervical     Cervical spinal stenosis     Foraminal stenosis of cervical region     Menopausal syndrome (hot flashes)     Obesity     Ella (H)     Bipolar 1 disorder (H)     Social History     Tobacco Use     Smoking status: Never Smoker     Smokeless tobacco: Never Used   Substance Use Topics     Alcohol use: Yes     Comment: once a week       ROS:   CONSTITUTIONAL:NEGATIVE for fever, chills, change in weight  INTEGUMENTARY/SKIN: NEGATIVE for worrisome rashes, moles or lesions  RESP:NEGATIVE for significant cough or SOB  CV: NEGATIVE for chest pain, palpitations  or peripheral edema  GI: NEGATIVE for nausea, abdominal pain, heartburn, or change in bowel habits  : as per HPI  MUSCULOSKELETAL: NEGATIVE for significant arthralgias or myalgia  Review of systems negative except as stated above.    OBJECTIVE:  /72   Pulse 91   Temp 98.7  F (37.1  C) (Oral)   Resp 16   Wt 105.2 kg (232 lb)   SpO2 99%   BMI 30.61 kg/m    GENERAL APPEARANCE: healthy, alert and no distress  ABDOMEN:  soft, nontender, no HSM or masses and bowel sounds normal  BACK: No CVA tenderness  SKIN: no suspicious lesions or rashes    (N39.0) Acute UTI  (primary encounter diagnosis)  Comment:   Plan: cefdinir (OMNICEF) 300 MG capsule,         phenazopyridine (PYRIDIUM) 200 MG tablet            (R30.0) Dysuria  Comment:   Plan: UA with Microscopic reflex to Culture, Urine         Culture Aerobic Bacterial            (R82.90) Nonspecific finding on examination of urine  Comment:   Plan: Urine Culture Aerobic Bacterial          As per ordered above  Drink plenty of fluids.  Prevention and treatment of UTI's discussed.Signs and symptoms of pyelonephritis mentioned.  Follow up with primary care physician if not improving    Patient expresses understanding and agreement with the assessment and plan as above.

## 2020-02-05 NOTE — PATIENT INSTRUCTIONS
Patient Education     Understanding Urinary Tract Infections (UTIs)  Most UTIs are caused by bacteria, although they may also be caused by viruses or fungi. Bacteria from the bowel are the most common source of infection. The infection may start because of any of the following:    Sexual activity. During sex, bacteria can travel from the penis, vagina, or rectum into the urethra.     Bacteria on the skin outside the rectum may travel into the urethra. This is more common in women since the rectum and urethra are closer to each other than in men. Wiping from front to back after using the toilet and keeping the area clean can help prevent germs from getting to the urethra.    Blockage of urine flow through the urinary tract. If urine sits too long, germs may start to grow out of control.      Parts of the urinary tract  The infection can occur in any part of the urinary tract.    The kidneys collect and store urine.    The ureters carry urine from the kidneys to the bladder.    The bladder holds urine until you are ready to let it out.    The urethra carries urine from the bladder out of the body. It is shorter in women, so bacteria can move through it more easily. The urethra is longer in men, so a UTI is less likely to reach the bladder or kidneys in men.  Date Last Reviewed: 1/1/2017 2000-2019 [employer]. [address] [place] [address], [place] [address]. All rights reserved. This information is not intended as a substitute for professional medical care. Always follow your healthcare professional's instructions.

## 2020-02-06 LAB
BACTERIA SPEC CULT: ABNORMAL
SPECIMEN SOURCE: ABNORMAL

## 2020-10-12 DIAGNOSIS — N95.1 MENOPAUSAL SYNDROME (HOT FLASHES): ICD-10-CM

## 2020-10-12 RX ORDER — ESTRADIOL 1 MG/1
1 TABLET ORAL DAILY
Qty: 90 TABLET | Refills: 0 | Status: SHIPPED | OUTPATIENT
Start: 2020-10-12 | End: 2021-01-18

## 2020-10-12 NOTE — TELEPHONE ENCOUNTER
"Requested Prescriptions   Pending Prescriptions Disp Refills     progesterone (PROMETRIUM) 100 MG capsule 90 capsule 0     Sig: Take 1 capsule (100 mg) by mouth daily       Hormone Replacement Therapy Passed - 10/12/2020  1:16 PM        Passed - Blood pressure under 140/90 in past 12 months     BP Readings from Last 3 Encounters:   02/05/20 120/72   10/31/19 100/70   10/16/19 98/70                 Passed - Recent (12 mo) or future (30 days) visit within the authorizing provider's specialty     Patient has had an office visit with the authorizing provider or a provider within the authorizing providers department within the previous 12 mos or has a future within next 30 days. See \"Patient Info\" tab in inbasket, or \"Choose Columns\" in Meds & Orders section of the refill encounter.              Passed - Patient has mammogram in past 2 years on file if age 50-75        Passed - Medication is active on med list        Passed - Patient is 18 years of age or older        Passed - No active pregnancy on record        Passed - No positive pregnancy test on record in past 12 months           estradiol (ESTRACE) 1 MG tablet 90 tablet 0     Sig: Take 1 tablet (1 mg) by mouth daily       Hormone Replacement Therapy Passed - 10/12/2020  1:16 PM        Passed - Blood pressure under 140/90 in past 12 months     BP Readings from Last 3 Encounters:   02/05/20 120/72   10/31/19 100/70   10/16/19 98/70                 Passed - Recent (12 mo) or future (30 days) visit within the authorizing provider's specialty     Patient has had an office visit with the authorizing provider or a provider within the authorizing providers department within the previous 12 mos or has a future within next 30 days. See \"Patient Info\" tab in inbasket, or \"Choose Columns\" in Meds & Orders section of the refill encounter.              Passed - Patient has mammogram in past 2 years on file if age 50-75        Passed - Medication is active on med list        " Passed - Patient is 18 years of age or older        Passed - No active pregnancy on record        Passed - No positive pregnancy test on record in past 12 months           Filled for 3 months then needs appt.    Daisy HELLER R.N.

## 2020-10-12 NOTE — TELEPHONE ENCOUNTER
Estradiol 1 mg      Last Written Prescription Date:  10/16/19  Last Fill Quantity: 90,   # refills: 3  Last Office Visit: 10/16/19-px  Future Office visit:  N/A     Progesterone 100 mg    Last Written Prescription Date:  10/16/19  Last Fill Quantity: 90,   # refills: 3  Last Office Visit: 10/16/19-px  Future Office visit:   N/A

## 2020-12-06 ENCOUNTER — HEALTH MAINTENANCE LETTER (OUTPATIENT)
Age: 55
End: 2020-12-06

## 2021-01-13 DIAGNOSIS — N95.1 MENOPAUSAL SYNDROME (HOT FLASHES): ICD-10-CM

## 2021-01-13 NOTE — TELEPHONE ENCOUNTER
estradiol      Last Written Prescription Date:  10/12/20  Last Fill Quantity: 90,   # refills: 0  Last Office Visit: 10/31/19  Future Office visit:       progesterone      Last Written Prescription Date:  10/12/20  Last Fill Quantity: 90,   # refills: 0  Last Office Visit: 10/31/19  Future Office visit:

## 2021-01-18 RX ORDER — ESTRADIOL 1 MG/1
1 TABLET ORAL DAILY
Qty: 90 TABLET | Refills: 0 | Status: SHIPPED | OUTPATIENT
Start: 2021-01-18 | End: 2021-04-15

## 2021-01-25 ENCOUNTER — OFFICE VISIT (OUTPATIENT)
Dept: OBGYN | Facility: CLINIC | Age: 56
End: 2021-01-25
Payer: COMMERCIAL

## 2021-01-25 VITALS — DIASTOLIC BLOOD PRESSURE: 70 MMHG | BODY MASS INDEX: 29.82 KG/M2 | WEIGHT: 226 LBS | SYSTOLIC BLOOD PRESSURE: 108 MMHG

## 2021-01-25 DIAGNOSIS — Z01.419 WOMEN'S ANNUAL ROUTINE GYNECOLOGICAL EXAMINATION: Primary | ICD-10-CM

## 2021-01-25 DIAGNOSIS — Z12.4 PAP SMEAR FOR CERVICAL CANCER SCREENING: ICD-10-CM

## 2021-01-25 DIAGNOSIS — Z79.890 HORMONE REPLACEMENT THERAPY: ICD-10-CM

## 2021-01-25 PROCEDURE — G0145 SCR C/V CYTO,THINLAYER,RESCR: HCPCS | Performed by: ADVANCED PRACTICE MIDWIFE

## 2021-01-25 PROCEDURE — 99396 PREV VISIT EST AGE 40-64: CPT | Performed by: ADVANCED PRACTICE MIDWIFE

## 2021-01-25 PROCEDURE — 87624 HPV HI-RISK TYP POOLED RSLT: CPT | Performed by: ADVANCED PRACTICE MIDWIFE

## 2021-01-25 NOTE — NURSING NOTE
"Chief Complaint   Patient presents with     Recheck Medication     estrogen and progesterone       Initial /70 (BP Location: Left arm, Cuff Size: Adult Regular)   Wt 102.5 kg (226 lb)   BMI 29.82 kg/m   Estimated body mass index is 29.82 kg/m  as calculated from the following:    Height as of 10/2/17: 1.854 m (6' 1\").    Weight as of this encounter: 102.5 kg (226 lb).  BP completed using cuff size: regular long    Questioned patient about current smoking habits.  Pt. has never smoked.          The following HM Due: mammogram  pap smear    Erin Ye CMA    "

## 2021-01-25 NOTE — PROGRESS NOTES
Christiana is a 55 year old  female who presents for annual exam.     Besides routine health maintenance,  she would like to discuss her hormone replacements.  HPI:  Christiana presents today for her annual exam. She would like to taper down on her hormone replacements. Her hot flashes have much improved. She has cut her estrogen dose in half, and plans to try going to every other day dosing.  LMP 2 years ago    REPRODUCTIVE/GYNECOLOGIC HISTORY:  Christiana is sexually active with male partner(s) and is currently in monogamous relationship x 2 years.   STI testing offered?  Declined  History of abnormal Pap smear:  No  SOCIAL HISTORY  Abuse: does not report having previously been physical or sexually abused.    Do you feel safe in your environment? YES       She  reports that she has never smoked. She has never used smokeless tobacco.      Last PHQ-9 score on record = No flowsheet data found.  Last GAD7 score on record = No flowsheet data found.  Alcohol Score = 0    HEALTH MAINTENANCE:  Cholesterol: (  Cholesterol   Date Value Ref Range Status   2017 190 <200 mg/dL Final   10/16/2013 195 0 - 200 mg/dL Final     Comment:     LDL Cholesterol is the primary guide to therapy.   The NCEP recommends further evaluation of: patients with cholesterol greater   than 200 mg/dL if additional risk factors are present, cholesterol greater   than   240 mg/dL, triglycerides greater than 150 mg/dL, or HDL less than 40 mg/dL.    History of abnormal lipids: No  Mammo: 2018 . History of abnormal Mammo: No.  Regular Self Breast Exams: Yes  TSH: (  TSH   Date Value Ref Range Status   2017 3.73 0.40 - 4.00 mU/L Final    )  Pap; (  Lab Results   Component Value Date    PAP NIL 2017    PAP OTHER-NIL, See Result 10/16/2013    )  Immunizations up to date: yes  (Gardasil, Tdap, Flu)  Health maintenance updated:  yes    HEALTHY HABITS  Eating habits: follows a balanced nutrition diet  Calcium/Vitamin D intake: source:  dairy  Adequate?   Exercise: How often do you exercise? 3-5 times/week;Cardio  Have you had an eye exam in the last two years? NO  Do you routinely see the dentist once or twice yearly? YES         HISTORY:  OB History    Para Term  AB Living   1 1 1 0 0 1   SAB TAB Ectopic Multiple Live Births   0 0 0 0 1      # Outcome Date GA Lbr Emmanuel/2nd Weight Sex Delivery Anes PTL Lv   1 Term         MELITA      Obstetric Comments   Child passed away at 9 months from severe chromosomal abnormalities     Past Medical History:   Diagnosis Date     Bipolar 1 disorder (H) 2017     Cancer (H)     colon     History of colon cancer, no staging 2013     History of sleep apnea 2013     ALPHONSO (obstructive sleep apnea) 2013     Status post tonsillectomy 2013     Status post uvulopalatopharyngoplasty 2013     Past Surgical History:   Procedure Laterality Date      SECTION       ENT SURGERY      tonsils and uvula removed     HEMICOLECTOMY, RT/LT      right     LASIK       LIPOSUCTION (LOCATION)       MAMMOPLASTY AUGMENTATION       TUBAL LIGATION       Family History   Problem Relation Age of Onset     Thyroid Disease Mother         passed away 2018     Bipolar Disorder Mother      Dementia Mother      Heart Disease Brother      Breast Cancer No family hx of      Social History     Socioeconomic History     Marital status:      Spouse name: None     Number of children: None     Years of education: None     Highest education level: None   Occupational History     Occupation:  school psychologist     Employer: SELF   Social Needs     Financial resource strain: None     Food insecurity     Worry: None     Inability: None     Transportation needs     Medical: None     Non-medical: None   Tobacco Use     Smoking status: Never Smoker     Smokeless tobacco: Never Used   Substance and Sexual Activity     Alcohol use: Yes     Comment: once a week     Drug use: No     Sexual activity: Yes      Partners: Male     Birth control/protection: Surgical   Lifestyle     Physical activity     Days per week: None     Minutes per session: None     Stress: None   Relationships     Social connections     Talks on phone: None     Gets together: None     Attends Orthodox service: None     Active member of club or organization: None     Attends meetings of clubs or organizations: None     Relationship status: None     Intimate partner violence     Fear of current or ex partner: None     Emotionally abused: None     Physically abused: None     Forced sexual activity: None   Other Topics Concern      Service No     Blood Transfusions No     Caffeine Concern No     Occupational Exposure Yes     Hobby Hazards No     Sleep Concern No     Stress Concern No     Weight Concern No     Special Diet Yes     Comment: no red meat     Back Care Not Asked     Exercise Yes     Bike Helmet No     Seat Belt Yes     Self-Exams Yes     Parent/sibling w/ CABG, MI or angioplasty before 65F 55M? Not Asked   Social History Narrative    ** Merged History Encounter **         Pt is recently  8/2013 and has 3 teen step-children.         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        Current Outpatient Medications:      BIOTIN PO, , Disp: , Rfl:      CALCIUM PO, Take 2 capsules by mouth daily , Disp: , Rfl:      estradiol (ESTRACE) 1 MG tablet, Take 1 tablet (1 mg) by mouth daily (Patient taking differently: Take 0.5 mg by mouth daily ), Disp: 90 tablet, Rfl: 0     Multiple Vitamins-Minerals (ONE-A-DAY WOMENS 50 PLUS PO), , Disp: , Rfl:      Omega-3 Fatty Acids (FISH OIL PO), Take 2 capsules by mouth daily , Disp: , Rfl:      progesterone (PROMETRIUM) 100 MG capsule, Take 1 capsule (100 mg) by mouth daily, Disp: 90 capsule, Rfl: 0     VITAMIN D, CHOLECALCIFEROL, PO, Take by mouth daily, Disp: , Rfl:      phenazopyridine (PYRIDIUM) 200 MG tablet, Take 1 tablet (200 mg) by mouth 3 times daily as needed  for irritation (Patient not taking: Reported on 1/25/2021), Disp: 12 tablet, Rfl: 0   No Known Allergies    Past medical, surgical, social and family history were reviewed and updated in [facility].    ROS:   CONSTITUTIONAL: NEGATIVE for fever, chills, change in weight  INTEGUMENTARY/SKIN: NEGATIVE for worrisome rashes, moles or lesions  EYES: NEGATIVE for vision changes or irritation  ENT: NEGATIVE for ear, mouth and throat problems  RESP: NEGATIVE for significant cough or SOB  BREAST: NEGATIVE for masses, tenderness or discharge  CV: NEGATIVE for chest pain, palpitations or peripheral edema  GI: NEGATIVE for nausea, abdominal pain, heartburn, or change in bowel habits  : NEGATIVE for unusual urinary or vaginal symptoms. No vaginal bleeding.  MUSCULOSKELETAL: NEGATIVE for significant arthralgias or myalgia  NEURO: NEGATIVE for weakness, dizziness or paresthesias  ENDOCRINE: NEGATIVE for temperature intolerance, skin/hair changes  HEME/ALLERGY/IMMUNE: NEGATIVE for bleeding problems  PSYCHIATRIC: NEGATIVE for changes in mood or affect     PHYSICAL EXAM:  /70 (BP Location: Left arm, Cuff Size: Adult Regular)   Wt 102.5 kg (226 lb)   BMI 29.82 kg/m     BMI: Body mass index is 29.82 kg/m .  Constitutional: healthy, alert and no distress  Neck: symmetrical, thyroid normal size, no masses present, no lymphadenopathy present.   Cardiovascular: RRR, no murmurs present  Respiratory: breathing unlabored, lungs CTA bilaterally  Breast:normal without masses, tenderness or nipple discharge and no palpable axillary masses or adenopathy  Gastrointestinal: abdomen soft, non-tender, bowel sounds present  PELVIC EXAM:  Vulva: No lesions, no adenopathy, BUS WNL  Vagina: Moist, pink, discharge normal  well rugated, no lesions  Cervix:smooth, pink, no visible lesions, Pap obtained  Uterus: Normal size, non-tender, mobile  Ovaries: No masses palpated  Rectal exam: deferred    ASSESSMENT/PLAN:      ICD-10-CM    1. Women's annual  routine gynecological examination  Z01.419    2. Pap smear for cervical cancer screening  Z12.4 Pap imaged thin layer screen with HPV - recommended age 30 - 65 years (select HPV order below)     HPV High Risk Types DNA Cervical   3. Hormone replacement therapy  Z79.890        1. Exam normal and appropriate for age. Patient declines mammogram at this time. Next annual exam due in one year.      COUNSELING:   Reviewed preventive health counseling, as reflected in patient instructions       Regular exercise       Healthy diet/nutrition       Vision screening   reports that she has never smoked. She has never used smokeless tobacco.      2. Labs pending. Will advise patient of results when available.    3. Patient to try tapering off HRT. She will let me know if she is successful, or if she needs to continue lower dose HRT for a while longer.      SIRIA Rowley, CNM

## 2021-01-29 LAB
COPATH REPORT: NORMAL
FINAL DIAGNOSIS: NORMAL
HPV HR 12 DNA CVX QL NAA+PROBE: NEGATIVE
HPV16 DNA SPEC QL NAA+PROBE: NEGATIVE
HPV18 DNA SPEC QL NAA+PROBE: NEGATIVE
PAP: NORMAL
SPECIMEN DESCRIPTION: NORMAL
SPECIMEN SOURCE CVX/VAG CYTO: NORMAL

## 2021-02-20 ENCOUNTER — HEALTH MAINTENANCE LETTER (OUTPATIENT)
Age: 56
End: 2021-02-20

## 2021-04-15 DIAGNOSIS — N95.1 MENOPAUSAL SYNDROME (HOT FLASHES): ICD-10-CM

## 2021-04-15 RX ORDER — ESTRADIOL 1 MG/1
1 TABLET ORAL DAILY
Qty: 90 TABLET | Refills: 0 | Status: CANCELLED | OUTPATIENT
Start: 2021-04-15

## 2021-04-15 NOTE — TELEPHONE ENCOUNTER
"Received a fax requesting a refill on Progesterone Micro 10MG capsules.    I cannot cue up a refill. The computer states \"cannot be ordered as a prescription\" when I click the reorder button.    Last OV 1/25/[birth year]  Rx 1/18/21  Disp 90  Refill 0    Cristal Wells CMA    "

## 2021-04-15 NOTE — TELEPHONE ENCOUNTER
Norma last note says she was to taper down on hormones.      Called pt. Pt stopped meds and had hot flashes again.  She takes half of the estrogen tab daily and 100mg of progesterone.  She would like to continue the meds for awhile to prevent hot flashes.    Daisy HELLER R.N.

## 2021-04-15 NOTE — TELEPHONE ENCOUNTER
Estradiol 1MG Tablets      Last Written Prescription Date:  1/18/2021  Last Fill Quantity: 90,   # refills: 0  Last Office Visit: 1/25/2021  Future Office visit:       Cristal Wells CMA

## 2021-04-21 NOTE — TELEPHONE ENCOUNTER
When I saw this patient in January, she was planning on tapering down on her HR. Please check with patient quantity of refills that she needs.       Thank you,    SIRIA Rowley, ANGELIQUEM

## 2021-04-22 NOTE — TELEPHONE ENCOUNTER
She doesn't want to try weaning for 2 years.  Added refills for one year.    Daisy HELLER R.N.

## 2021-04-23 RX ORDER — ESTRADIOL 1 MG/1
0.5 TABLET ORAL DAILY
Qty: 45 TABLET | Refills: 3 | Status: SHIPPED | OUTPATIENT
Start: 2021-04-23 | End: 2022-05-11

## 2021-04-23 RX ORDER — PROGESTERONE 100 MG/1
100 CAPSULE ORAL DAILY
Qty: 90 CAPSULE | Refills: 3 | Status: SHIPPED | OUTPATIENT
Start: 2021-04-23 | End: 2021-12-07

## 2021-09-25 ENCOUNTER — HEALTH MAINTENANCE LETTER (OUTPATIENT)
Age: 56
End: 2021-09-25

## 2022-01-11 ENCOUNTER — MYC MEDICAL ADVICE (OUTPATIENT)
Dept: OBGYN | Facility: CLINIC | Age: 57
End: 2022-01-11
Payer: COMMERCIAL

## 2022-01-11 DIAGNOSIS — N95.0 POST-MENOPAUSE BLEEDING: Primary | ICD-10-CM

## 2022-01-11 NOTE — TELEPHONE ENCOUNTER
Please address the my chart message.  Would you like the pt to have a pelvic US?  MINDY Mohan, RN

## 2022-01-27 ENCOUNTER — ANCILLARY PROCEDURE (OUTPATIENT)
Dept: ULTRASOUND IMAGING | Facility: CLINIC | Age: 57
End: 2022-01-27
Attending: ADVANCED PRACTICE MIDWIFE
Payer: COMMERCIAL

## 2022-01-27 DIAGNOSIS — N95.0 POST-MENOPAUSE BLEEDING: ICD-10-CM

## 2022-01-27 PROCEDURE — 76830 TRANSVAGINAL US NON-OB: CPT | Performed by: OBSTETRICS & GYNECOLOGY

## 2022-01-27 PROCEDURE — 76856 US EXAM PELVIC COMPLETE: CPT | Performed by: OBSTETRICS & GYNECOLOGY

## 2022-01-28 ENCOUNTER — TELEPHONE (OUTPATIENT)
Dept: OBGYN | Facility: CLINIC | Age: 57
End: 2022-01-28
Payer: COMMERCIAL

## 2022-01-28 NOTE — TELEPHONE ENCOUNTER
Attempted to call patient regarding ultrasound results. Ultrasound indicates multiple fibroids and possible involvement with endometrial cavity. Also reveals thickened endometrial lining. Recommendation is sonohysterography versus hysteroscopy. Patient needs to schedule appointment with physician to discuss options.    Also sent patient a MyChart message    Thank you,    SIRIA Rowley, ANGELIQUEM

## 2022-03-02 ENCOUNTER — OFFICE VISIT (OUTPATIENT)
Dept: OBGYN | Facility: CLINIC | Age: 57
End: 2022-03-02
Payer: COMMERCIAL

## 2022-03-02 VITALS
DIASTOLIC BLOOD PRESSURE: 68 MMHG | HEIGHT: 72 IN | SYSTOLIC BLOOD PRESSURE: 122 MMHG | BODY MASS INDEX: 29.26 KG/M2 | WEIGHT: 216 LBS

## 2022-03-02 DIAGNOSIS — N95.0 POST-MENOPAUSAL BLEEDING: Primary | ICD-10-CM

## 2022-03-02 PROCEDURE — 58100 BIOPSY OF UTERUS LINING: CPT | Performed by: OBSTETRICS & GYNECOLOGY

## 2022-03-02 PROCEDURE — 88305 TISSUE EXAM BY PATHOLOGIST: CPT | Performed by: PATHOLOGY

## 2022-03-02 NOTE — PROGRESS NOTES
INDICATIONS:                                                    Is a pregnancy test required: No.  Was a consent obtained?  Yes    Having endometrial biopsy for post-menopausal bleeding and thickened endometrial lining    Patient on estrace/prometrium HRT for hot flash control.  Has attempted to wean in past and been unsuccessful due to recurrent symptoms.  Had endometrial biopsy 2019 for same indication with benign result    PROCEDURE;                                                      A speculum was placed in the vagina and cervix prepped with betadine. A tenaculum was not attached to the cervix. A small plastic 5 mm Pipelle syringe curette was inserted into the cervical canal. The uterus was sounded to 9 cm's. A vigorous four quadrant biopsy was performed, removing amount moderate  of tissue. The speculum was removed. This tissue was placed in Formalin and sent to pathology.    The patient tolerated the procedure  well and she reported there was no cramping.      POST PROCEDURE;                                                      There  was no cramping at the time of discharge. She  tolerated the procedure well. There were no complications. Patient was discharged in stable condition.    Patient advised to call the clinic if severe pelvic pain, fever or heavy bleeding.    Bandar Araujo MD

## 2022-03-02 NOTE — NURSING NOTE
"Chief Complaint   Patient presents with     Follow Up     fibroids       Initial /68 (BP Location: Left arm, Patient Position: Chair, Cuff Size: Adult Regular)   Ht 1.854 m (6' 1\")   Wt 98 kg (216 lb)   LMP 2022 (Approximate)   Breastfeeding No   BMI 28.50 kg/m   Estimated body mass index is 28.5 kg/m  as calculated from the following:    Height as of this encounter: 1.854 m (6' 1\").    Weight as of this encounter: 98 kg (216 lb).  BP completed using cuff size: regular    Questioned patient about current smoking habits.  Pt. has never smoked.          The following HM Due: NONE    Michelle Hearn CMA    "

## 2022-03-04 ENCOUNTER — TELEPHONE (OUTPATIENT)
Dept: OBGYN | Facility: CLINIC | Age: 57
End: 2022-03-04
Payer: COMMERCIAL

## 2022-03-04 DIAGNOSIS — N95.0 POST-MENOPAUSAL BLEEDING: Primary | ICD-10-CM

## 2022-03-04 LAB
PATH REPORT.COMMENTS IMP SPEC: NORMAL
PATH REPORT.COMMENTS IMP SPEC: NORMAL
PATH REPORT.FINAL DX SPEC: NORMAL
PATH REPORT.GROSS SPEC: NORMAL
PATH REPORT.MICROSCOPIC SPEC OTHER STN: NORMAL
PATH REPORT.RELEVANT HX SPEC: NORMAL
PHOTO IMAGE: NORMAL

## 2022-03-04 NOTE — TELEPHONE ENCOUNTER
Procedure name(s) - multi select hysteroscopy D&C    Reason for procedure persistent postmenopausal bleeding and suspected polyp    Surgeon: Van    Is this a multi surgeon case? No    Laterality N/A    Request for additional equipment Other (see comments) None    Hysteroscopic Morcellator (Myosure)    Anesthesia General    Is the patient known to have any of the following? None of the above    Initiate Pre-op orders for above procedure: Yes, as ordered in Epic Additional orders noted there also   Location of Case: [facility]    PA Assistant: No    Operating room  requested: No    Urgency of Surgery: Routine    Surgeon Procedure Time (incision to closure) in minutes (per procedure as applicable) 30    Note: Surgical Case Time Needed (in minutes)   Patient Class (for admit prior to surgery, specify number of days in comments): Same day (surgery center outpatient)    H&P To Be Completed By: PCP     needed? No    Post-Op Appointment 2 weeks    Vendor Needed? No    Spinal Cord Monitoring? No    Patient has Electronic Implant: No

## 2022-03-07 DIAGNOSIS — Z01.812 ENCOUNTER FOR PREPROCEDURE SCREENING LABORATORY TESTING FOR COVID-19: Primary | ICD-10-CM

## 2022-03-07 DIAGNOSIS — Z11.52 ENCOUNTER FOR PREPROCEDURE SCREENING LABORATORY TESTING FOR COVID-19: Primary | ICD-10-CM

## 2022-03-07 NOTE — TELEPHONE ENCOUNTER
Type of surgery: hysteroscopy, d&c  Location of surgery: [facility]  Date and time of surgery: 4/12/22 @ 7:00 am  Surgeon: Dr. Araujo  Pre-Op Appt Date: Patient advised to schedule.  Post-Op Appt Date: 4/26/22   Packet sent out: Yes  Pre-cert/Authorization completed:  No  Date: 3/7/22

## 2022-03-12 ENCOUNTER — HEALTH MAINTENANCE LETTER (OUTPATIENT)
Age: 57
End: 2022-03-12

## 2022-04-02 SDOH — HEALTH STABILITY: PHYSICAL HEALTH: ON AVERAGE, HOW MANY MINUTES DO YOU ENGAGE IN EXERCISE AT THIS LEVEL?: 30 MIN

## 2022-04-02 SDOH — ECONOMIC STABILITY: FOOD INSECURITY: WITHIN THE PAST 12 MONTHS, YOU WORRIED THAT YOUR FOOD WOULD RUN OUT BEFORE YOU GOT MONEY TO BUY MORE.: NEVER TRUE

## 2022-04-02 SDOH — HEALTH STABILITY: PHYSICAL HEALTH: ON AVERAGE, HOW MANY DAYS PER WEEK DO YOU ENGAGE IN MODERATE TO STRENUOUS EXERCISE (LIKE A BRISK WALK)?: 3 DAYS

## 2022-04-02 SDOH — ECONOMIC STABILITY: INCOME INSECURITY: HOW HARD IS IT FOR YOU TO PAY FOR THE VERY BASICS LIKE FOOD, HOUSING, MEDICAL CARE, AND HEATING?: NOT HARD AT ALL

## 2022-04-02 SDOH — ECONOMIC STABILITY: TRANSPORTATION INSECURITY
IN THE PAST 12 MONTHS, HAS THE LACK OF TRANSPORTATION KEPT YOU FROM MEDICAL APPOINTMENTS OR FROM GETTING MEDICATIONS?: NO

## 2022-04-02 SDOH — ECONOMIC STABILITY: TRANSPORTATION INSECURITY
IN THE PAST 12 MONTHS, HAS LACK OF TRANSPORTATION KEPT YOU FROM MEETINGS, WORK, OR FROM GETTING THINGS NEEDED FOR DAILY LIVING?: NO

## 2022-04-02 SDOH — ECONOMIC STABILITY: FOOD INSECURITY: WITHIN THE PAST 12 MONTHS, THE FOOD YOU BOUGHT JUST DIDN'T LAST AND YOU DIDN'T HAVE MONEY TO GET MORE.: NEVER TRUE

## 2022-04-02 SDOH — ECONOMIC STABILITY: INCOME INSECURITY: IN THE LAST 12 MONTHS, WAS THERE A TIME WHEN YOU WERE NOT ABLE TO PAY THE MORTGAGE OR RENT ON TIME?: NO

## 2022-04-02 ASSESSMENT — LIFESTYLE VARIABLES
HOW MANY STANDARD DRINKS CONTAINING ALCOHOL DO YOU HAVE ON A TYPICAL DAY: 1 OR 2
HOW OFTEN DO YOU HAVE A DRINK CONTAINING ALCOHOL: MONTHLY OR LESS
HOW OFTEN DO YOU HAVE SIX OR MORE DRINKS ON ONE OCCASION: NEVER

## 2022-04-02 ASSESSMENT — SOCIAL DETERMINANTS OF HEALTH (SDOH)
IN A TYPICAL WEEK, HOW MANY TIMES DO YOU TALK ON THE PHONE WITH FAMILY, FRIENDS, OR NEIGHBORS?: ONCE A WEEK
ARE YOU MARRIED, WIDOWED, DIVORCED, SEPARATED, NEVER MARRIED, OR LIVING WITH A PARTNER?: LIVING WITH PARTNER
HOW OFTEN DO YOU ATTEND CHURCH OR RELIGIOUS SERVICES?: NEVER
DO YOU BELONG TO ANY CLUBS OR ORGANIZATIONS SUCH AS CHURCH GROUPS UNIONS, FRATERNAL OR ATHLETIC GROUPS, OR SCHOOL GROUPS?: YES
HOW OFTEN DO YOU GET TOGETHER WITH FRIENDS OR RELATIVES?: ONCE A WEEK

## 2022-04-04 NOTE — PROGRESS NOTES
Pre-Visit Planning   Next 5 appointments (look out 90 days)    Apr 05, 2022 11:30 AM  (Arrive by 11:10 AM)  Pre-Operative Physical with April Suly Burgos MD  [facility] ([facility] )  Arrive at:  [facility] [address] [facility] [address]  [phone]   Apr 11, 2022  9:00 AM  Pre-procedure Covid with [place] COVID LAB  [facility] ([facility] ) [address] [place] [address]  [phone]   Apr 26, 2022  1:45 PM  SHORT with Bandar Araujo MD  [facility] ([facility] ) [address]  [address]  [facility] [address]  [phone]        Appointment Notes for this encounter:   I need to schedule a pre-op appointment for the Hysteroscopy D & C I am having to remove a polyp from my uterus.  That appointment is Tuesday, April 12.//[facility]     Questionnaires Reviewed/Assigned  No additional questionnaires are needed    Patient preferred phone number: [phone]    Patient contact not needed. Pt completed online check in.     Silvia Jackson, RN, BSN, PHN  [facility]

## 2022-04-05 ENCOUNTER — OFFICE VISIT (OUTPATIENT)
Dept: FAMILY MEDICINE | Facility: CLINIC | Age: 57
End: 2022-04-05
Payer: COMMERCIAL

## 2022-04-05 VITALS
HEART RATE: 71 BPM | BODY MASS INDEX: 29.39 KG/M2 | DIASTOLIC BLOOD PRESSURE: 72 MMHG | TEMPERATURE: 97.4 F | WEIGHT: 217 LBS | SYSTOLIC BLOOD PRESSURE: 100 MMHG | HEIGHT: 72 IN | OXYGEN SATURATION: 100 %

## 2022-04-05 DIAGNOSIS — Z01.818 PREOP GENERAL PHYSICAL EXAM: Primary | ICD-10-CM

## 2022-04-05 DIAGNOSIS — N84.0 UTERINE POLYP: ICD-10-CM

## 2022-04-05 PROBLEM — F30.9 MANIA (H): Status: RESOLVED | Noted: 2017-04-25 | Resolved: 2022-04-05

## 2022-04-05 PROBLEM — F31.9 BIPOLAR 1 DISORDER (H): Status: RESOLVED | Noted: 2017-05-12 | Resolved: 2022-04-05

## 2022-04-05 LAB — HGB BLD-MCNC: 14.2 G/DL (ref 11.7–15.7)

## 2022-04-05 PROCEDURE — 36415 COLL VENOUS BLD VENIPUNCTURE: CPT | Performed by: FAMILY MEDICINE

## 2022-04-05 PROCEDURE — 85018 HEMOGLOBIN: CPT | Performed by: FAMILY MEDICINE

## 2022-04-05 PROCEDURE — 99204 OFFICE O/P NEW MOD 45 MIN: CPT | Performed by: FAMILY MEDICINE

## 2022-04-05 PROCEDURE — 80048 BASIC METABOLIC PNL TOTAL CA: CPT | Performed by: FAMILY MEDICINE

## 2022-04-05 NOTE — PROGRESS NOTES
[facility]  [id number] [place] [id number]  Phone: [phone]  Primary Provider: Mara Rico  Pre-op Performing Provider: ALTON BOWEN      PREOPERATIVE EVALUATION:  Today's date: 4/5/2022    Christiana Hill is a 56 year old female who presents for a preoperative evaluation.    Surgical Information:  Surgery/Procedure: D and C   Surgery Location: [facility]  Surgeon:Dr. Araujo  Surgery Date: 4/12/22  Time of Surgery: 0700  Where patient plans to recover: At home with family  Fax number for surgical facility: Note does not need to be faxed, will be available electronically in Epic.    Type of Anesthesia Anticipated: General    Assessment & Plan     The proposed surgical procedure is considered INTERMEDIATE risk.    Preop general physical exam  Reviewed and normal  - Basic metabolic panel  (Ca, Cl, CO2, Creat, Gluc, K, Na, BUN)  - Hemoglobin    Uterine polyp  Surgery to biopsy           Risks and Recommendations:  The patient has the following additional risks and recommendations for perioperative complications:   - No identified additional risk factors other than previously addressed    Medication Instructions:  Patient is on no chronic medications  Advised to hold fish oil    RECOMMENDATION:  APPROVAL GIVEN to proceed with proposed procedure, without further diagnostic evaluation.          Subjective     HPI related to upcoming procedure: Undergoing Hysteroscopy with D&C for removal of polyp.    Preop Questions 4/2/2022   1. Have you ever had a heart attack or stroke? No   2. Have you ever had surgery on your heart or blood vessels, such as a stent placement, a coronary artery bypass, or surgery on an artery in your head, neck, heart, or legs? No   3. Do you have chest pain with activity? No   4. Do you have a history of  heart failure? No   5. Do you currently have a cold, bronchitis or symptoms of other infection? No   6. Do you have a  cough, shortness of breath, or wheezing? No   7. Do you or anyone in your family have previous history of blood clots? No   8. Do you or does anyone in your family have a serious bleeding problem such as prolonged bleeding following surgeries or cuts? No   9. Have you ever had problems with anemia or been told to take iron pills? YES - as a child   10. Have you had any abnormal blood loss such as black, tarry or bloody stools, or abnormal vaginal bleeding? YES - abnormal vaginal bleeding   11. Have you ever had a blood transfusion? No   12. Are you willing to have a blood transfusion if it is medically needed before, during, or after your surgery? Yes   13. Have you or any of your relatives ever had problems with anesthesia? No   14. Do you have sleep apnea, excessive snoring or daytime drowsiness? No   15. Do you have any artifical heart valves or other implanted medical devices like a pacemaker, defibrillator, or continuous glucose monitor? No   16. Do you have artificial joints? No   17. Are you allergic to latex? No   18. Is there any chance that you may be pregnant? No       Health Care Directive:  Patient does not have a Health Care Directive or Living Will: Discussed advance care planning with patient; information given to patient to review.    Preoperative Review of :   reviewed - no record of controlled substances prescribed.      Status of Chronic Conditions:  See problem list for active medical problems.  Problems all longstanding and stable, except as noted/documented.  See ROS for pertinent symptoms related to these conditions.      Review of Systems  CONSTITUTIONAL: NEGATIVE for fever, chills, change in weight  INTEGUMENTARY/SKIN: NEGATIVE for worrisome rashes, moles or lesions  EYES: NEGATIVE for vision changes or irritation  ENT/MOUTH: NEGATIVE for ear, mouth and throat problems  RESP: NEGATIVE for significant cough or SOB  CV: NEGATIVE for chest pain, palpitations or peripheral edema  GI:  NEGATIVE for nausea, abdominal pain, heartburn, or change in bowel habits  : NEGATIVE for frequency, dysuria, or hematuria  MUSCULOSKELETAL: NEGATIVE for significant arthralgias or myalgia  NEURO: NEGATIVE for weakness, dizziness or paresthesias  ENDOCRINE: NEGATIVE for temperature intolerance, skin/hair changes  HEME: NEGATIVE for bleeding problems  PSYCHIATRIC: NEGATIVE for changes in mood or affect    Patient Active Problem List    Diagnosis Date Noted     Menopausal syndrome (hot flashes) 2014     Priority: Medium     Obesity 2014     Priority: Medium     Degeneration of cervical intervertebral disc 2014     Priority: Medium     Facet arthropathy, cervical 2014     Priority: Medium     Cervical spinal stenosis 2014     Priority: Medium     Foraminal stenosis of cervical region 2014     Priority: Medium     Abnormal kidney function study 10/17/2013     Priority: Medium     Status post tonsillectomy 2013     Priority: Medium     Status post uvulopalatopharyngoplasty 2013     Priority: Medium     ALPHONSO (obstructive sleep apnea) 2013     Priority: Medium     History of colon cancer, no staging 2013     Priority: Medium     Colonoscopy due 2017  Sees. Dr. Baeza        CARDIOVASCULAR SCREENING; LDL GOAL LESS THAN 160 2013     Priority: Medium      Past Medical History:   Diagnosis Date     Bipolar 1 disorder (H) 2017     Cancer (H) 2007    colon     Degeneration of cervical intervertebral disc      History of colon cancer, no staging 2013     History of sleep apnea 2013     Ella (H) 2017     ALPHONSO (obstructive sleep apnea) 2013     Status post tonsillectomy 2013     Status post uvulopalatopharyngoplasty 2013     Past Surgical History:   Procedure Laterality Date     APPENDECTOMY  2007    During cancer surgery      SECTION       ENT SURGERY      tonsils and uvula removed     HEMICOLECTOMY, RT/LT      right  "    LASIK       LIPOSUCTION (LOCATION)       MAMMOPLASTY AUGMENTATION       TUBAL LIGATION       Current Outpatient Medications   Medication Sig Dispense Refill     BIOTIN PO        CALCIUM PO Take 2 capsules by mouth daily        estradiol (ESTRACE) 1 MG tablet Take 0.5 tablets (0.5 mg) by mouth daily 45 tablet 3     Multiple Vitamins-Minerals (ONE-A-DAY WOMENS 50 PLUS PO)        Omega-3 Fatty Acids (FISH OIL PO) Take 2 capsules by mouth daily        progesterone (PROMETRIUM) 100 MG capsule Take 1 capsule (100 mg) by mouth daily 90 capsule 1     VITAMIN D, CHOLECALCIFEROL, PO Take by mouth daily         No Known Allergies     Social History     Tobacco Use     Smoking status: Never Smoker     Smokeless tobacco: Never Used   Substance Use Topics     Alcohol use: Yes     Comment: once a week       History   Drug Use No         Objective     /72   Pulse 71   Temp 97.4  F (36.3  C) (Oral)   Ht 1.854 m (6' 1\")   Wt 98.4 kg (217 lb)   SpO2 100%   BMI 28.63 kg/m      Physical Exam  GENERAL APPEARANCE: healthy, alert and no distress  HENT: ear canals and TM's normal and nose and mouth without ulcers or lesions  RESP: lungs clear to auscultation - no rales, rhonchi or wheezes  CV: regular rate and rhythm, normal S1 S2, no S3 or S4 and no murmur, click or rub   ABDOMEN: soft, nontender, no HSM or masses and bowel sounds normal  NEURO: Normal strength and tone, sensory exam grossly normal, mentation intact and speech normal    No results for input(s): HGB, PLT, INR, NA, POTASSIUM, CR, A1C in the last 55857 hours.     Diagnostics:  Labs pending at this time.  Results will be reviewed when available.   No EKG required, no history of coronary heart disease, significant arrhythmia, peripheral arterial disease or other structural heart disease.    Revised Cardiac Risk Index (RCRI):  The patient has the following serious cardiovascular risks for perioperative complications:   - No serious cardiac risks = 0 points "     RCRI Interpretation: 0 points: Class I (very low risk - 0.4% complication rate)           Signed Electronically by: Lala Burgos MD  Copy of this evaluation report is provided to requesting physician.

## 2022-04-05 NOTE — PATIENT INSTRUCTIONS
Preparing for Your Surgery  Getting started  A nurse will call you to review your health history and instructions. They will give you an arrival time based on your scheduled surgery time. Please be ready to share:    Your doctor's clinic name and phone number    Your medical, surgical and anesthesia history    A list of allergies and sensitivities    A list of medicines, including herbal treatments and over-the-counter drugs    Whether the patient has a legal guardian (ask how to send us the papers in advance)  Please tell us if you're pregnant--or if there's any chance you might be pregnant. Some surgeries may injure a fetus (unborn baby), so they require a pregnancy test. Surgeries that are safe for a fetus don't always need a test, and you can choose whether to have one.   If you have a child who's having surgery, please ask for a copy of Preparing for Your Child's Surgery.    Preparing for surgery    Within 30 days of surgery: Have a pre-op exam (sometimes called an H&P, or History and Physical). This can be done at a clinic or pre-operative center.  ? If you're having a , you may not need this exam. Talk to your care team.    At your pre-op exam, talk to your care team about all medicines you take. If you need to stop any medicines before surgery, ask when to start taking them again.  ? We do this for your safety. Many medicines can make you bleed too much during surgery. Some change how well surgery (anesthesia) drugs work.    Call your insurance company to let them know you're having surgery. (If you don't have insurance, call [phone].)    Call your clinic if there's any change in your health. This includes signs of a cold or flu (sore throat, runny nose, cough, rash, fever). It also includes a scrape or scratch near the surgery site.    If you have questions on the day of surgery, call your hospital or surgery center.  COVID testing  You may need to be tested for COVID-19 before having  surgery. If so, your surgical team will give you instructions for scheduling this test, separate from your preoperative history and physical.  Eating and drinking guidelines  For your safety: Unless your surgeon tells you otherwise, follow the guidelines below.    Eat and drink as usual until 8 hours before surgery. After that, no food or milk.    Drink clear liquids until 2 hours before surgery. These are liquids you can see through, like water, Gatorade and Propel Water. You may also have black coffee and tea (no cream or milk).    Nothing by mouth within 2 hours of surgery. This includes gum, candy and breath mints.    If you drink alcohol: Stop drinking it the night before surgery.    If your care team tells you to take medicine on the morning of surgery, it's okay to take it with a sip of water.  Preventing infection    Shower or bathe the night before and morning of your surgery. Follow the instructions your clinic gave you. (If no instructions, use regular soap.)    Don't shave or clip hair near your surgery site. We'll remove the hair if needed.    Don't smoke or vape the morning of surgery. You may chew nicotine gum up to 2 hours before surgery. A nicotine patch is okay.  ? Note: Some surgeries require you to completely quit smoking and nicotine. Check with your surgeon.    Your care team will make every effort to keep you safe from infection. We will:  ? Clean our hands often with soap and water (or an alcohol-based hand rub).  ? Clean the skin at your surgery site with a special soap that kills germs.  ? Give you a special gown to keep you warm. (Cold raises the risk of infection.)  ? Wear special hair covers, masks, gowns and gloves during surgery.  ? Give antibiotic medicine, if prescribed. Not all surgeries need antibiotics.  What to bring on the day of surgery    Photo ID and insurance card    Copy of your health care directive, if you have one    Glasses and hearing aides (bring cases)  ? You can't  wear contacts during surgery    Inhaler and eye drops, if you use them (tell us about these when you arrive)    CPAP machine or breathing device, if you use them    A few personal items, if spending the night    If you have . . .  ? A pacemaker, ICD (cardiac defibrillator) or other implant: Bring the ID card.  ? An implanted stimulator: Bring the remote control.  ? A legal guardian: Bring a copy of the certified (court-stamped) guardianship papers.  Please remove any jewelry, including body piercings. Leave jewelry and other valuables at home.  If you're going home the day of surgery    You must have a responsible adult drive you home. They should stay with you overnight as well.    If you don't have someone to stay with you, and you aren't safe to go home alone, we may keep you overnight. Insurance often won't pay for this.  After surgery  If it's hard to control your pain or you need more pain medicine, please call your surgeon's office.  Questions?   If you have any questions for your care team, list them here: _________________________________________________________________________________________________________________________________________________________________________ ____________________________________ ____________________________________ ____________________________________  For informational purposes only. Not to replace the advice of your health care provider. Copyright   2003, 2019 [facility]. All rights reserved. Clinically reviewed by Abi Dozier MD. [employer] [id number] - REV 07/21.

## 2022-04-06 LAB
ANION GAP SERPL CALCULATED.3IONS-SCNC: 6 MMOL/L (ref 3–14)
BUN SERPL-MCNC: 18 MG/DL (ref 7–30)
CALCIUM SERPL-MCNC: 8.9 MG/DL (ref 8.5–10.1)
CHLORIDE BLD-SCNC: 106 MMOL/L (ref 94–109)
CO2 SERPL-SCNC: 27 MMOL/L (ref 20–32)
CREAT SERPL-MCNC: 0.97 MG/DL (ref 0.52–1.04)
GFR SERPL CREATININE-BSD FRML MDRD: 68 ML/MIN/1.73M2
GLUCOSE BLD-MCNC: 95 MG/DL (ref 70–99)
POTASSIUM BLD-SCNC: 4.4 MMOL/L (ref 3.4–5.3)
SODIUM SERPL-SCNC: 139 MMOL/L (ref 133–144)

## 2022-04-11 ENCOUNTER — LAB (OUTPATIENT)
Dept: LAB | Facility: CLINIC | Age: 57
End: 2022-04-11
Attending: OBSTETRICS & GYNECOLOGY
Payer: COMMERCIAL

## 2022-04-11 DIAGNOSIS — Z11.52 ENCOUNTER FOR PREPROCEDURE SCREENING LABORATORY TESTING FOR COVID-19: ICD-10-CM

## 2022-04-11 DIAGNOSIS — Z01.812 ENCOUNTER FOR PREPROCEDURE SCREENING LABORATORY TESTING FOR COVID-19: ICD-10-CM

## 2022-04-11 PROCEDURE — U0003 INFECTIOUS AGENT DETECTION BY NUCLEIC ACID (DNA OR RNA); SEVERE ACUTE RESPIRATORY SYNDROME CORONAVIRUS 2 (SARS-COV-2) (CORONAVIRUS DISEASE [COVID-19]), AMPLIFIED PROBE TECHNIQUE, MAKING USE OF HIGH THROUGHPUT TECHNOLOGIES AS DESCRIBED BY CMS-2020-01-R: HCPCS

## 2022-04-11 PROCEDURE — U0005 INFEC AGEN DETEC AMPLI PROBE: HCPCS

## 2022-04-12 ENCOUNTER — TELEPHONE (OUTPATIENT)
Dept: NURSING | Facility: CLINIC | Age: 57
End: 2022-04-12
Payer: COMMERCIAL

## 2022-04-12 ENCOUNTER — LAB REQUISITION (OUTPATIENT)
Dept: LAB | Facility: CLINIC | Age: 57
End: 2022-04-12
Payer: COMMERCIAL

## 2022-04-12 ENCOUNTER — TELEPHONE (OUTPATIENT)
Dept: FAMILY MEDICINE | Facility: CLINIC | Age: 57
End: 2022-04-12
Payer: COMMERCIAL

## 2022-04-12 ENCOUNTER — TRANSFERRED RECORDS (OUTPATIENT)
Dept: HEALTH INFORMATION MANAGEMENT | Facility: CLINIC | Age: 57
End: 2022-04-12

## 2022-04-12 LAB — SARS-COV-2 RNA RESP QL NAA+PROBE: NEGATIVE

## 2022-04-12 PROCEDURE — 88305 TISSUE EXAM BY PATHOLOGIST: CPT | Mod: TC,ORL | Performed by: OBSTETRICS & GYNECOLOGY

## 2022-04-12 NOTE — TELEPHONE ENCOUNTER
Spoke with OCP who is unable to sign H&P as OCP. Called answering service back to page Dr. Roopa Burgos.     Upon checking back to ensure note was signed, noted that it was not. Called [facility] to speak with Dr. Roopa Burgos, who is out ill today. Spoke with Shruthi mcneill, who says that the H&P is signed by Dr. Roopa Burgos, but the chart encounter is not signed and closed out. She was unable to sign the encounter herself and will notify Dr. Roopa Burgos when she returns to the clinic.    Attempted to call PRESTON Lai back at surgery center to update her, but no answer.    Silvia Miller RN, BSN  [facility]   Triage Nurse Advisor

## 2022-04-12 NOTE — TELEPHONE ENCOUNTER
Pre-op RN [facility] surgery center calling. Says that they need the Pre-op H&P signed, as pt is having surgery this morning at 0700.    Will page OCP to see what the process is for signing.    Silvia Miller RN, BSN  [facility]   Triage Nurse Advisor

## 2022-04-12 NOTE — TELEPHONE ENCOUNTER
General Call:     Who is calling:  Sayra - from [facility]    Reason for Call:  Needing pre-op from 04/05/22. Unsigned. Surgery today @ 10:30. Msg to Leslee to advise.     What are your questions or concerns:  Need signed pre-op for surgery.    Date of last appointment with provider: 04/05/22    Okay to leave a detailed message:No at Other phone number:  Needs call back asap. [phone].

## 2022-04-13 PROCEDURE — 88305 TISSUE EXAM BY PATHOLOGIST: CPT | Mod: 26 | Performed by: PATHOLOGY

## 2022-04-26 ENCOUNTER — OFFICE VISIT (OUTPATIENT)
Dept: OBGYN | Facility: CLINIC | Age: 57
End: 2022-04-26
Payer: COMMERCIAL

## 2022-04-26 VITALS — BODY MASS INDEX: 29.16 KG/M2 | WEIGHT: 221 LBS | DIASTOLIC BLOOD PRESSURE: 66 MMHG | SYSTOLIC BLOOD PRESSURE: 108 MMHG

## 2022-04-26 DIAGNOSIS — Z09 POSTOP CHECK: Primary | ICD-10-CM

## 2022-04-26 PROCEDURE — 99212 OFFICE O/P EST SF 10 MIN: CPT | Performed by: OBSTETRICS & GYNECOLOGY

## 2022-04-26 NOTE — PROGRESS NOTES
Post Op Follow Up    Christiana Hill is here for post op follow up visit following hysteroscopy, D&C with polypectomy using Myosure on 4/12/22.    Procedure was uncomplicated.  Final pathology demonstrated   Final Diagnosis   A.  Endometrium, curettage:  -Fragments of endometrial polyp.   -Fragments of endometrial tissue with cystic atrophy.  -Benign superficial squamous epithelium.              Since surgery patient has not had any problems.  Just some initial light bleeding    Intraop photos reviewed.    Activity, bowel function and bladder function have all returned to normal.    EXAM:  Deferred     Follow up as needed for annual gyne care    Kris Araujo MD

## 2022-05-11 ENCOUNTER — MYC MEDICAL ADVICE (OUTPATIENT)
Dept: OBGYN | Facility: CLINIC | Age: 57
End: 2022-05-11
Payer: COMMERCIAL

## 2022-05-11 DIAGNOSIS — N95.1 MENOPAUSAL SYNDROME (HOT FLASHES): ICD-10-CM

## 2022-05-11 RX ORDER — ESTRADIOL 1 MG/1
0.5 TABLET ORAL DAILY
Qty: 45 TABLET | Refills: 1 | Status: SHIPPED | OUTPATIENT
Start: 2022-05-11 | End: 2024-01-04

## 2022-05-11 NOTE — TELEPHONE ENCOUNTER
"  Requested Prescriptions   Pending Prescriptions Disp Refills     estradiol (ESTRACE) 1 MG tablet 45 tablet 1     Sig: Take 0.5 tablets (0.5 mg) by mouth daily       Hormone Replacement Therapy Failed - 5/11/2022  1:12 PM        Failed - Patient has mammogram in past 2 years on file if age 50-75        Passed - Blood pressure under 140/90 in past 12 months     BP Readings from Last 3 Encounters:   04/26/22 108/66   04/05/22 100/72   03/02/22 122/68                 Passed - Recent (12 mo) or future (30 days) visit within the authorizing provider's specialty     Patient has had an office visit with the authorizing provider or a provider within the authorizing providers department within the previous 12 mos or has a future within next 30 days. See \"Patient Info\" tab in inbasket, or \"Choose Columns\" in Meds & Orders section of the refill encounter.              Passed - Medication is active on med list        Passed - Patient is 18 years of age or older        Passed - No active pregnancy on record        Passed - No positive pregnancy test on record in past 12 months           Routing refill request to provider for review/approval because:  Mammo not up to date. Pt advised to make appt.    Erna Moss RN      "

## 2022-07-28 ENCOUNTER — MYC MEDICAL ADVICE (OUTPATIENT)
Dept: FAMILY MEDICINE | Facility: CLINIC | Age: 57
End: 2022-07-28

## 2022-07-29 NOTE — TELEPHONE ENCOUNTER
Can RN please call breast center to see what options they have (see pt's mychart message).  Patient would then likely need to check with insurance to see if they will pay for it and get back to us.    Mara Rico PA-C

## 2022-07-29 NOTE — TELEPHONE ENCOUNTER
Called and spoke with the Breast Center who inform that pt should still schedule a mammogram. [facility] center informs that mammogram is the gold standard and even with little breast tissue, that would be the best option for pt.    Responded to pt playnikhart message updating her with information learned from [facility].    Trisha Rodriguez RN

## 2022-08-02 ENCOUNTER — TELEPHONE (OUTPATIENT)
Dept: FAMILY MEDICINE | Facility: CLINIC | Age: 57
End: 2022-08-02

## 2022-08-02 NOTE — TELEPHONE ENCOUNTER
Patient Quality Outreach    Patient is due for the following:   Breast Cancer Screening - Mammogram  Physical Preventive Adult Physical    Next Steps:   schedule physical, schedule mammogram     Type of outreach:    Phone, left message for patient/parent to call back.      Questions for provider review:    None     Iraida Nuñez MA      Patient Quality Outreach    Patient is due for the following:   Breast Cancer Screening - Mammogram  Physical Preventive Adult Physical    Next Steps:   schedule physical, schedule mammogram     Type of outreach:    Sent [employer] message.    Next Steps:  Reach out within 90 days via Phone.    Max number of attempts reached: Yes. Will try again in 90 days if patient still on fail list.    Questions for provider review:    None     Iraida Nuñez MA

## 2022-08-16 ENCOUNTER — MYC MEDICAL ADVICE (OUTPATIENT)
Dept: FAMILY MEDICINE | Facility: CLINIC | Age: 57
End: 2022-08-16

## 2022-08-17 ENCOUNTER — HOSPITAL ENCOUNTER (OUTPATIENT)
Dept: MAMMOGRAPHY | Facility: CLINIC | Age: 57
Discharge: HOME | End: 2022-08-17
Attending: PHYSICIAN ASSISTANT | Admitting: PHYSICIAN ASSISTANT
Payer: COMMERCIAL

## 2022-08-17 DIAGNOSIS — Z12.31 VISIT FOR SCREENING MAMMOGRAM: ICD-10-CM

## 2022-08-17 PROCEDURE — 77063 BREAST TOMOSYNTHESIS BI: CPT

## 2022-10-31 DIAGNOSIS — N95.1 MENOPAUSAL SYNDROME (HOT FLASHES): ICD-10-CM

## 2022-10-31 RX ORDER — ESTRADIOL 1 MG/1
0.5 TABLET ORAL DAILY
Qty: 45 TABLET | Refills: 1 | Status: CANCELLED | OUTPATIENT
Start: 2022-10-31

## 2022-10-31 NOTE — TELEPHONE ENCOUNTER
estradiol      Last Written Prescription Date:  5/11/22  Last Fill Quantity: 45,   # refills: 1  Last Office Visit: 4/26/22  Future Office visit:

## 2022-11-28 ENCOUNTER — OFFICE VISIT (OUTPATIENT)
Dept: OBGYN | Facility: CLINIC | Age: 57
End: 2022-11-28
Attending: ADVANCED PRACTICE MIDWIFE
Payer: COMMERCIAL

## 2022-11-28 ENCOUNTER — LAB (OUTPATIENT)
Dept: LAB | Facility: CLINIC | Age: 57
End: 2022-11-28
Attending: ADVANCED PRACTICE MIDWIFE
Payer: COMMERCIAL

## 2022-11-28 VITALS
HEART RATE: 76 BPM | WEIGHT: 230.5 LBS | DIASTOLIC BLOOD PRESSURE: 80 MMHG | HEIGHT: 72 IN | SYSTOLIC BLOOD PRESSURE: 125 MMHG | BODY MASS INDEX: 31.22 KG/M2

## 2022-11-28 DIAGNOSIS — Z11.4 ENCOUNTER FOR SCREENING FOR HIV: ICD-10-CM

## 2022-11-28 DIAGNOSIS — Z11.3 SCREEN FOR SEXUALLY TRANSMITTED DISEASES: ICD-10-CM

## 2022-11-28 DIAGNOSIS — N95.2 VAGINAL ATROPHY: ICD-10-CM

## 2022-11-28 DIAGNOSIS — Z00.00 VISIT FOR ANNUAL HEALTH EXAMINATION: Primary | ICD-10-CM

## 2022-11-28 LAB
HBV SURFACE AB SERPL IA-ACNC: >1000 M[IU]/ML
HBV SURFACE AB SERPL IA-ACNC: REACTIVE M[IU]/ML
HBV SURFACE AG SERPL QL IA: NONREACTIVE
HCV AB SERPL QL IA: NONREACTIVE
HIV 1+2 AB+HIV1 P24 AG SERPL QL IA: NONREACTIVE
T PALLIDUM AB SER QL: NONREACTIVE

## 2022-11-28 PROCEDURE — 99396 PREV VISIT EST AGE 40-64: CPT | Performed by: ADVANCED PRACTICE MIDWIFE

## 2022-11-28 PROCEDURE — G0463 HOSPITAL OUTPT CLINIC VISIT: HCPCS

## 2022-11-28 PROCEDURE — 87591 N.GONORRHOEAE DNA AMP PROB: CPT | Performed by: ADVANCED PRACTICE MIDWIFE

## 2022-11-28 PROCEDURE — 86706 HEP B SURFACE ANTIBODY: CPT

## 2022-11-28 PROCEDURE — 87340 HEPATITIS B SURFACE AG IA: CPT

## 2022-11-28 PROCEDURE — 86803 HEPATITIS C AB TEST: CPT

## 2022-11-28 PROCEDURE — 87491 CHLMYD TRACH DNA AMP PROBE: CPT | Performed by: ADVANCED PRACTICE MIDWIFE

## 2022-11-28 PROCEDURE — 36415 COLL VENOUS BLD VENIPUNCTURE: CPT

## 2022-11-28 PROCEDURE — 86780 TREPONEMA PALLIDUM: CPT

## 2022-11-28 PROCEDURE — 87389 HIV-1 AG W/HIV-1&-2 AB AG IA: CPT

## 2022-11-28 RX ORDER — PROGESTERONE 100 MG/1
100 CAPSULE ORAL DAILY
Qty: 90 CAPSULE | Refills: 3 | Status: SHIPPED | OUTPATIENT
Start: 2022-11-28 | End: 2023-11-16

## 2022-11-28 RX ORDER — ESTRADIOL 0.5 MG/1
0.5 TABLET ORAL DAILY
Qty: 90 TABLET | Refills: 3 | Status: SHIPPED | OUTPATIENT
Start: 2022-11-28 | End: 2024-01-04

## 2022-11-28 NOTE — PROGRESS NOTES
Progress Note    SUBJECTIVE:  Christiana Hill is an 57 year old, , who requests an Annual Preventive Exam.     Concerns today include: Feels well overall.  - Recently broke up with long-term partner and would like a full STI screen, no known exposures.   - Endorses hot flashes and vasomotor symptoms of menopause. Currently on HRT, PO estrace 0.5mg and PO progesterone 100mg daily. Reports adequate relief with medications. Has tried weaning off HRT in the past, but hot flashes return. Desires refill today.  - Last lipid panel in 2017: triglycerides 153^, ^, HDL 43(low). Recommend repeat screening today, patient declines.  - No previous labs of glucose or HgbA1c for screening of diabetes. No family history of diabetes. Patient declines glucose and HgbA1c today.  - Recommended flu and Covid booster vaccinations, patient already received.  - Admits her diet and exercise have not been as good as they previously were when following her weight loss program. Endorses this program is very strict and is hard to stick with. She plans to resume this, with modifications, to keep it more sustainable.  - Patient reports mood is stable. Is not currently on medications or in therapy.    Menstrual History:  Menstrual History 2017 10/31/2019 3/2/2022   LAST MENSTRUAL PERIOD 2015 10/31/2017 2022       Last    Lab Results   Component Value Date    PAP NIL 2021     History of abnormal Pap smear: NO - age 30-65 PAP every 5 years with negative HPV co-testing recommended. Last pap 2021 NILM, HPV neg. Due next 2026.    Last   Lab Results   Component Value Date    HPV16 Negative 2021     Last   Lab Results   Component Value Date    HPV18 Negative 2021     Last   Lab Results   Component Value Date    HRHPV Negative 2021       Mammogram current: yes, next due 2023.  Last Mammogram: 22  MA Screen w Implants Digital Bilat    Result Date: 2022  FINDINGS: The breasts are  heterogeneously dense, which may obscure small masses. There are breast augmentation changes in both breasts.  There is no radiographic evidence of malignancy.   IMPRESSION: ACR BI-RADS Category 2: Benign  RECOMMENDED FOLLOW-UP: Annual routine screening mammogram    Result Date: 12/7/2018  Narrative: SCREENING MAMMOGRAM, BILATERAL, DIGITAL w/CAD, 12/7/2018 12:35 PM BREAST DENSITY: Scattered fibroglandular densities. CLINICAL INFORMATION: Breast screening.  Pt has bilateral breast implants; Screening breast examination, 10/10/2013 FINDINGS: Negative. Stable exam. Screening exam in one year recommended. Implants partially obscure the breast parenchyma reducing mammographic sensitivity.        Last Colonoscopy: History of colon cancer in 2007. Gets colonoscopies every 4 years. Is due next year, will schedule with oncologist. All colonoscopies after cancer diagnosis were WNL.   No results found for this or any previous visit.      HISTORY:  Prescription Medications as of 11/28/2022       Rx Number Disp Refills Start End Last Dispensed Date Next Fill Date Owning Pharmacy    BIOTIN PO            Class: Historical    Route: Oral    CALCIUM PO            Sig: Take 2 capsules by mouth daily     Class: Historical    Route: Oral    estradiol (ESTRACE) 1 MG tablet  45 tablet 1 5/11/2022    [id number] - [place], [place] - [address]    Sig: Take 0.5 tablets (0.5 mg) by mouth daily    Class: E-Prescribe    Route: Oral    Multiple Vitamins-Minerals (ONE-A-DAY WOMENS 50 PLUS PO)            Class: Historical    Route: Oral    Omega-3 Fatty Acids (FISH OIL PO)            Sig: Take 2 capsules by mouth daily     Class: Historical    Route: Oral    progesterone (PROMETRIUM) 100 MG capsule  90 capsule 1 5/11/2022    [facility] #[id number] - [place], [place] - [address]    Sig: Take 1 capsule (100 mg) by mouth daily    Class: E-Prescribe     Route: Oral    VITAMIN D, CHOLECALCIFEROL, PO            Sig: Take by mouth daily    Class: Historical    Route: Oral        No Known Allergies  Immunization History   Administered Date(s) Administered     COVID-19 Vaccine 18+ (Moderna) 2021, 2021, 2022     COVID-19 Vaccine Bivalent Booster 18+ (Moderna) 2022     Tdap (Adacel,Boostrix) 2009       OB History    Para Term  AB Living   1 1 1 0 0 0   SAB IAB Ectopic Multiple Live Births   0 0 0 0 1   Obstetric Comments   Child passed away at 9 months from severe chromosomal abnormalities     Past Medical History:   Diagnosis Date     Abnormal uterine bleeding due to endometrial polyp 2022     Bipolar 1 disorder (H) 2017    pt states improper diagnosis.     Cancer (H)     colon     Degeneration of cervical intervertebral disc      History of colon cancer, no staging 2013     History of sleep apnea 2013     Ella (H) 2017     ALPHONSO (obstructive sleep apnea) 2013     Status post tonsillectomy 2013     Status post uvulopalatopharyngoplasty 2013     Past Surgical History:   Procedure Laterality Date     APPENDECTOMY      During cancer surgery      SECTION       ENT SURGERY      tonsils and uvula removed     HEMICOLECTOMY, RT/LT      right     LASIK       LIPOSUCTION (LOCATION)       MAMMOPLASTY AUGMENTATION       TUBAL LIGATION       Family History   Problem Relation Age of Onset     Thyroid Disease Mother         passed away 2018     Bipolar Disorder Mother      Dementia Mother      Mental Illness Mother         Bipolar Disorder     Heart Disease Brother      Breast Cancer No family hx of      Social History     Socioeconomic History     Marital status:    Occupational History     Occupation:  school psychologist     Employer: SELF   Tobacco Use     Smoking status: Never     Smokeless tobacco: Never   Vaping Use     Vaping Use: Never used   Substance and Sexual  Activity     Alcohol use: Yes     Comment: once a week     Drug use: No     Sexual activity: Yes     Partners: Male     Birth control/protection: Female Surgical   Other Topics Concern      Service No     Blood Transfusions No     Caffeine Concern No     Occupational Exposure Yes     Hobby Hazards No     Sleep Concern No     Stress Concern No     Weight Concern No     Special Diet Yes     Comment: no red meat     Exercise Yes     Bike Helmet No     Seat Belt Yes     Self-Exams Yes     Parent/sibling w/ CABG, MI or angioplasty before 65F 55M? No   Social History Narrative    ** Merged History Encounter **         Pt is recently  8/2013 and has 3 teen step-children.         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      Social Determinants of Health     Financial Resource Strain: Low Risk      Difficulty of Paying Living Expenses: Not hard at all   Food Insecurity: No Food Insecurity     Worried About Running Out of Food in the Last Year: Never true     Ran Out of Food in the Last Year: Never true   Transportation Needs: No Transportation Needs     Lack of Transportation (Medical): No     Lack of Transportation (Non-Medical): No   Physical Activity: Insufficiently Active     Days of Exercise per Week: 3 days     Minutes of Exercise per Session: 30 min   Stress: No Stress Concern Present     Feeling of Stress : Only a little   Social Connections: Moderately Isolated     Frequency of Communication with Friends and Family: Once a week     Frequency of Social Gatherings with Friends and Family: Once a week     Attends Oriental orthodox Services: Never     Active Member of Clubs or Organizations: Yes     Marital Status: Living with partner   Housing Stability: Low Risk      Unable to Pay for Housing in the Last Year: No     Number of Places Lived in the Last Year: 2     Unstable Housing in the Last Year: No       Review of Systems     All other systems reviewed and are negative.    No  "flowsheet data found.      EXAM:  Blood pressure 125/80, pulse 76, height 1.854 m (6' 1\"), weight 104.6 kg (230 lb 8 oz), not currently breastfeeding. Body mass index is 30.41 kg/m .  General - pleasant female in no acute distress.  Skin - no suspicious lesions or rashes  EENT-  PERRLA, euthyroid with out palpable nodules.  Ears- Otoscopic examination of external auditory canals and tympanic membranes clear, pearly grey without cerumen.  Neck - supple without lymphadenopathy.  Lungs - clear to auscultation bilaterally.  Heart - regular rate and rhythm. Slight murmur ascultated.  Abdomen - soft, nontender, nondistended, no masses or organomegaly noted.  Musculoskeletal - no gross deformities.  Neurological - normal strength, sensation, and mental status.    Breast Exam: Deferred     Pelvic Exam: Deferred      ASSESSMENT:  Encounter Diagnoses   Name Primary?     Screen for sexually transmitted diseases      Encounter for screening for HIV      Vaginal atrophy      Visit for annual health examination Yes          PLAN:   Orders Placed This Encounter   Procedures     HIV Antigen Antibody Combo     Treponema Abs w Reflex to RPR and Titer     Hepatitis C antibody     Hepatitis B surface antigen     Hepatitis B Surface Antibody      - Rx refilled for HRT for vasomotor symptoms of menopause. Lifestyle measures also reviewed.  - Recommended healthy diet with 5 servings of fruits and vegetables daily and exercise of 30 minutes, 5 days per week.   - Reviewed importance of lipid, glucose, and HgbA1c screenings for cardiac and overall health. Patient declined today, but will reconsider after resuming her exercise and diet regimen in efforts to keep lab levels WNL.  Additional teaching done at this visit regarding calcium (1200 mg per day), exercise, mental health and weight/diet.    Return to clinic in one year.  Follow-up as needed.    Mery ELIZABETH, DNP student, am serving as a scribe; to document services personally " performed by  SIRIA Hudson CNM based on data collection and the provider's statements to me.     I agree with the PFSH and ROS as completed by Mery Rasmussen, JB student except for changes made by me. The remainder of the encounter was performed by me and scribed by Mery Rasmussen DNP student. The scribed note accurately reflects my personal services and decisions made by me.   SIRIA Hudson CNM

## 2022-11-28 NOTE — LETTER
2022       RE: Christiana Hill  [address]  [place] [address]     Dear Colleague,    Thank you for referring your patient, Christiana Hill, to the [facility] [place] at [facility]. Please see a copy of my visit note below.        Progress Note    SUBJECTIVE:  Christiana Hill is an 57 year old, , who requests an Annual Preventive Exam.     Concerns today include: Feels well overall.  - Recently broke up with long-term partner and would like a full STI screen, no known exposures.   - Endorses hot flashes and vasomotor symptoms of menopause. Currently on HRT, PO estrace 0.5mg and PO progesterone 100mg daily. Reports adequate relief with medications. Has tried weaning off HRT in the past, but hot flashes return. Desires refill today.  - Last lipid panel in 2017: triglycerides 153^, ^, HDL 43(low). Recommend repeat screening today, patient declines.  - No previous labs of glucose or HgbA1c for screening of diabetes. No family history of diabetes. Patient declines glucose and HgbA1c today.  - Recommended flu and Covid booster vaccinations, patient already received.  - Admits her diet and exercise have not been as good as they previously were when following her weight loss program. Endorses this program is very strict and is hard to stick with. She plans to resume this, with modifications, to keep it more sustainable.  - Patient reports mood is stable. Is not currently on medications or in therapy.    Menstrual History:  Menstrual History 2017 10/31/2019 3/2/2022   LAST MENSTRUAL PERIOD 2015 10/31/2017 2022       Last    Lab Results   Component Value Date    PAP NIL 2021     History of abnormal Pap smear: NO - age 30-65 PAP every 5 years with negative HPV co-testing recommended. Last pap 2021 NILM, HPV neg. Due next 2026.    Last   Lab Results   Component Value Date    HPV16  Negative 01/25/2021     Last   Lab Results   Component Value Date    HPV18 Negative 01/25/2021     Last   Lab Results   Component Value Date    HRHPV Negative 01/25/2021       Mammogram current: yes, next due 8/2023.  Last Mammogram: 8/17/22  MA Screen w Implants Digital Bilat    Result Date: 8/17/2022  FINDINGS: The breasts are heterogeneously dense, which may obscure small masses. There are breast augmentation changes in both breasts.  There is no radiographic evidence of malignancy.   IMPRESSION: ACR BI-RADS Category 2: Benign  RECOMMENDED FOLLOW-UP: Annual routine screening mammogram    Result Date: 12/7/2018  Narrative: SCREENING MAMMOGRAM, BILATERAL, DIGITAL w/CAD, 12/7/2018 12:35 PM BREAST DENSITY: Scattered fibroglandular densities. CLINICAL INFORMATION: Breast screening.  Pt has bilateral breast implants; Screening breast examination, 10/10/2013 FINDINGS: Negative. Stable exam. Screening exam in one year recommended. Implants partially obscure the breast parenchyma reducing mammographic sensitivity.        Last Colonoscopy: History of colon cancer in 2007. Gets colonoscopies every 4 years. Is due next year, will schedule with oncologist. All colonoscopies after cancer diagnosis were WNL.   No results found for this or any previous visit.      HISTORY:  Prescription Medications as of 11/28/2022       Rx Number Disp Refills Start End Last Dispensed Date Next Fill Date Owning Pharmacy    BIOTIN PO            Class: Historical    Route: Oral    CALCIUM PO            Sig: Take 2 capsules by mouth daily     Class: Historical    Route: Oral    estradiol (ESTRACE) 1 MG tablet  45 tablet 1 5/11/2022    [id number] - [facility] [place] [address]    Sig: Take 0.5 tablets (0.5 mg) by mouth daily    Class: E-Prescribe    Route: Oral    Multiple Vitamins-Minerals (ONE-A-DAY WOMENS 50 PLUS PO)            Class: Historical    Route: Oral    Omega-3 Fatty Acids (FISH OIL  PO)            Sig: Take 2 capsules by mouth daily     Class: Historical    Route: Oral    progesterone (PROMETRIUM) 100 MG capsule  90 capsule 1 2022    [id number] [place] [address]    Sig: Take 1 capsule (100 mg) by mouth daily    Class: E-Prescribe    Route: Oral    VITAMIN D, CHOLECALCIFEROL, PO            Sig: Take by mouth daily    Class: Historical    Route: Oral        No Known Allergies  Immunization History   Administered Date(s) Administered     COVID-19 Vaccine 18+ (Moderna) 2021, 2021, 2022     COVID-19 Vaccine Bivalent Booster 18+ (Moderna) 2022     Tdap (Adacel,Boostrix) 2009       OB History    Para Term  AB Living   1 1 1 0 0 0   SAB IAB Ectopic Multiple Live Births   0 0 0 0 1   Obstetric Comments   Child passed away at 9 months from severe chromosomal abnormalities     Past Medical History:   Diagnosis Date     Abnormal uterine bleeding due to endometrial polyp 2022     Bipolar 1 disorder (H) 2017    pt states improper diagnosis.     Cancer (H)     colon     Degeneration of cervical intervertebral disc      History of colon cancer, no staging 2013     History of sleep apnea 2013     Ella (H) 2017     ALPHONSO (obstructive sleep apnea) 2013     Status post tonsillectomy 2013     Status post uvulopalatopharyngoplasty 2013     Past Surgical History:   Procedure Laterality Date     APPENDECTOMY      During cancer surgery      SECTION       ENT SURGERY      tonsils and uvula removed     HEMICOLECTOMY, RT/LT      right     LASIK       LIPOSUCTION (LOCATION)       MAMMOPLASTY AUGMENTATION       TUBAL LIGATION       Family History   Problem Relation Age of Onset     Thyroid Disease Mother         passed away 2018     Bipolar Disorder Mother      Dementia Mother      Mental Illness Mother         Bipolar Disorder     Heart Disease  Brother      Breast Cancer No family hx of      Social History     Socioeconomic History     Marital status:    Occupational History     Occupation:  school psychologist     Employer: SELF   Tobacco Use     Smoking status: Never     Smokeless tobacco: Never   Vaping Use     Vaping Use: Never used   Substance and Sexual Activity     Alcohol use: Yes     Comment: once a week     Drug use: No     Sexual activity: Yes     Partners: Male     Birth control/protection: Female Surgical   Other Topics Concern      Service No     Blood Transfusions No     Caffeine Concern No     Occupational Exposure Yes     Hobby Hazards No     Sleep Concern No     Stress Concern No     Weight Concern No     Special Diet Yes     Comment: no red meat     Exercise Yes     Bike Helmet No     Seat Belt Yes     Self-Exams Yes     Parent/sibling w/ CABG, MI or angioplasty before 65F 55M? No   Social History Narrative    ** Merged History Encounter **         Pt is recently  8/2013 and has 3 teen step-children.         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      Social Determinants of Health     Financial Resource Strain: Low Risk      Difficulty of Paying Living Expenses: Not hard at all   Food Insecurity: No Food Insecurity     Worried About Running Out of Food in the Last Year: Never true     Ran Out of Food in the Last Year: Never true   Transportation Needs: No Transportation Needs     Lack of Transportation (Medical): No     Lack of Transportation (Non-Medical): No   Physical Activity: Insufficiently Active     Days of Exercise per Week: 3 days     Minutes of Exercise per Session: 30 min   Stress: No Stress Concern Present     Feeling of Stress : Only a little   Social Connections: Moderately Isolated     Frequency of Communication with Friends and Family: Once a week     Frequency of Social Gatherings with Friends and Family: Once a week     Attends Anabaptism Services: Never     Active  "Member of Clubs or Organizations: Yes     Marital Status: Living with partner   Housing Stability: Low Risk      Unable to Pay for Housing in the Last Year: No     Number of Places Lived in the Last Year: 2     Unstable Housing in the Last Year: No       Review of Systems     All other systems reviewed and are negative.    No flowsheet data found.      EXAM:  Blood pressure 125/80, pulse 76, height 1.854 m (6' 1\"), weight 104.6 kg (230 lb 8 oz), not currently breastfeeding. Body mass index is 30.41 kg/m .  General - pleasant female in no acute distress.  Skin - no suspicious lesions or rashes  EENT-  PERRLA, euthyroid with out palpable nodules.  Ears- Otoscopic examination of external auditory canals and tympanic membranes clear, pearly grey without cerumen.  Neck - supple without lymphadenopathy.  Lungs - clear to auscultation bilaterally.  Heart - regular rate and rhythm. Slight murmur ascultated.  Abdomen - soft, nontender, nondistended, no masses or organomegaly noted.  Musculoskeletal - no gross deformities.  Neurological - normal strength, sensation, and mental status.    Breast Exam: Deferred     Pelvic Exam: Deferred      ASSESSMENT:  Encounter Diagnoses   Name Primary?     Screen for sexually transmitted diseases      Encounter for screening for HIV      Vaginal atrophy      Visit for annual health examination Yes          PLAN:   Orders Placed This Encounter   Procedures     HIV Antigen Antibody Combo     Treponema Abs w Reflex to RPR and Titer     Hepatitis C antibody     Hepatitis B surface antigen     Hepatitis B Surface Antibody      - Rx refilled for HRT for vasomotor symptoms of menopause. Lifestyle measures also reviewed.  - Recommended healthy diet with 5 servings of fruits and vegetables daily and exercise of 30 minutes, 5 days per week.   - Reviewed importance of lipid, glucose, and HgbA1c screenings for cardiac and overall health. Patient declined today, but will reconsider after resuming her " exercise and diet regimen in efforts to keep lab levels WNL.  Additional teaching done at this visit regarding calcium (1200 mg per day), exercise, mental health and weight/diet.    Return to clinic in one year.  Follow-up as needed.    I, Mery Rasmussen DNP student, am serving as a scribe; to document services personally performed by  SIRIA Hudson CNM based on data collection and the provider's statements to me.     I agree with the PFSH and ROS as completed by Mery Rasmussen DNP student except for changes made by me. The remainder of the encounter was performed by me and scribed by Mery Rasmussen DNP student. The scribed note accurately reflects my personal services and decisions made by me.       SIRIA Hudson CNM

## 2022-11-29 LAB
C TRACH DNA SPEC QL NAA+PROBE: NEGATIVE
N GONORRHOEA DNA SPEC QL NAA+PROBE: NEGATIVE

## 2023-01-07 ENCOUNTER — HEALTH MAINTENANCE LETTER (OUTPATIENT)
Age: 58
End: 2023-01-07

## 2023-05-18 ENCOUNTER — E-VISIT (OUTPATIENT)
Dept: URGENT CARE | Facility: CLINIC | Age: 58
End: 2023-05-18
Payer: COMMERCIAL

## 2023-05-18 DIAGNOSIS — R21 RASH: Primary | ICD-10-CM

## 2023-05-18 PROCEDURE — 99207 PR NON-BILLABLE SERV PER CHARTING: CPT | Performed by: NURSE PRACTITIONER

## 2023-05-18 NOTE — PATIENT INSTRUCTIONS
Dear Christiana Hill?     After reviewing your responses, I am unable to make a diagnosis that can be treated online.    You will not be charged for this eVisit.     We are dedicated to helping you achieve your best health and would like to see you in one of our many clinic locations - a primary care provider would be ideal for your concern.    Please use [facility] to schedule a visit with a provider or call [phone] ([phone]) to schedule at any of our locations.    Thanks for choosing?us?as your health care partner,?   ?   Mya Edwards, CNP?     Could be Rosacea, but needs to be confirmed in person

## 2023-05-19 ENCOUNTER — OFFICE VISIT (OUTPATIENT)
Dept: FAMILY MEDICINE | Facility: CLINIC | Age: 58
End: 2023-05-19
Payer: COMMERCIAL

## 2023-05-19 VITALS
DIASTOLIC BLOOD PRESSURE: 72 MMHG | OXYGEN SATURATION: 98 % | WEIGHT: 236.6 LBS | BODY MASS INDEX: 32.05 KG/M2 | HEART RATE: 84 BPM | SYSTOLIC BLOOD PRESSURE: 110 MMHG | TEMPERATURE: 98.1 F | HEIGHT: 72 IN

## 2023-05-19 DIAGNOSIS — L71.9 ROSACEA: Primary | ICD-10-CM

## 2023-05-19 PROCEDURE — 99213 OFFICE O/P EST LOW 20 MIN: CPT | Performed by: INTERNAL MEDICINE

## 2023-05-19 RX ORDER — ERYTHROMYCIN 20 MG/G
GEL TOPICAL DAILY
Qty: 60 G | Refills: 4 | Status: SHIPPED | OUTPATIENT
Start: 2023-05-19

## 2023-05-19 NOTE — PROGRESS NOTES
"  Assessment & Plan   Problem List Items Addressed This Visit    None  Visit Diagnoses     Rosacea    -  Primary    Relevant Medications    erythromycin with ethanol (EMGEL) 2 % gel         Alpha blockers were not covered   Will start with antibiotic treatment     If not sucessful, move to alpha blockers   Lifestyle triggers reviewed and discussed             BMI:   Estimated body mass index is 31.22 kg/m  as calculated from the following:    Height as of this encounter: 1.854 m (6' 0.99\").    Weight as of this encounter: 107.3 kg (236 lb 9.6 oz).   Weight management plan: Discussed healthy diet and exercise guidelines    Work on weight loss  Regular exercise    Jacky Parker MD  [facility] KATHARINA Villeda is a 57 year old, presenting for the following health issues:  Derm Problem (Rash on face more than month)        5/19/2023     1:44 PM   Additional Questions   Roomed by An HRIA         5/19/2023     1:44 PM   Patient Reported Additional Medications   Patient reports taking the following new medications none     History of Present Illness       Reason for visit:  Rash on my face.  Symptom onset:  More than a month  Symptoms include:  Red bump rash started on cheeks, now lower near chin  Symptom intensity:  Mild  Symptom progression:  Worsening  Had these symptoms before:  No  What makes it worse:  No  What makes it better:  No    She eats 2-3 servings of fruits and vegetables daily.She consumes 1 sweetened beverage(s) daily.She exercises with enough effort to increase her heart rate 30 to 60 minutes per day.  She exercises with enough effort to increase her heart rate 4 days per week.   She is taking medications regularly.     Rash of facce last few month   Good           Review of Systems   Constitutional, HEENT, cardiovascular, pulmonary, gi and gu systems are negative, except as otherwise noted.      Objective    /72   Pulse 84   Temp 98.1  F (36.7  C) (Temporal)   Ht " "1.854 m (6' 0.99\")   Wt 107.3 kg (236 lb 9.6 oz)   SpO2 98%   BMI 31.22 kg/m    Body mass index is 31.22 kg/m .  Physical Exam   GENERAL: healthy, alert and no distress  EYES: Eyes grossly normal to inspection, PERRL and conjunctivae and sclerae normal  HENT: ear canals and TM's normal, nose and mouth without ulcers or lesions  NECK: no adenopathy, no asymmetry, masses, or scars and thyroid normal to palpation  RESP: lungs clear to auscultation - no rales, rhonchi or wheezes  CV: regular rate and rhythm, normal S1 S2, no S3 or S4, no murmur, click or rub, no peripheral edema and peripheral pulses strong  ABDOMEN: soft, nontender, no hepatosplenomegaly, no masses and bowel sounds normal  MS: no gross musculoskeletal defects noted, no edema  SKIN: no suspicious lesions or rashes  NEURO: Normal strength and tone, mentation intact and speech normal  BACK: no CVA tenderness, no paralumbar tenderness  PSYCH: mentation appears normal, affect normal/bright  LYMPH: no cervical, supraclavicular, axillary, or inguinal adenopathy    No results found for any visits on 05/19/23.                "

## 2023-10-30 ENCOUNTER — PATIENT OUTREACH (OUTPATIENT)
Dept: CARE COORDINATION | Facility: CLINIC | Age: 58
End: 2023-10-30
Payer: COMMERCIAL

## 2023-11-13 ENCOUNTER — PATIENT OUTREACH (OUTPATIENT)
Dept: CARE COORDINATION | Facility: CLINIC | Age: 58
End: 2023-11-13
Payer: COMMERCIAL

## 2023-11-16 DIAGNOSIS — N95.2 VAGINAL ATROPHY: ICD-10-CM

## 2023-11-16 RX ORDER — PROGESTERONE 100 MG/1
100 CAPSULE ORAL DAILY
Qty: 90 CAPSULE | Refills: 3 | Status: SHIPPED | OUTPATIENT
Start: 2023-11-16 | End: 2024-01-04

## 2024-01-04 ENCOUNTER — OFFICE VISIT (OUTPATIENT)
Dept: FAMILY MEDICINE | Facility: CLINIC | Age: 59
End: 2024-01-04
Payer: COMMERCIAL

## 2024-01-04 VITALS
BODY MASS INDEX: 28.91 KG/M2 | RESPIRATION RATE: 16 BRPM | DIASTOLIC BLOOD PRESSURE: 74 MMHG | HEIGHT: 72 IN | HEART RATE: 60 BPM | WEIGHT: 213.4 LBS | TEMPERATURE: 97.1 F | SYSTOLIC BLOOD PRESSURE: 110 MMHG | OXYGEN SATURATION: 100 %

## 2024-01-04 DIAGNOSIS — Z00.00 ROUTINE GENERAL MEDICAL EXAMINATION AT A HEALTH CARE FACILITY: Primary | ICD-10-CM

## 2024-01-04 DIAGNOSIS — Z23 NEED FOR TDAP VACCINATION: ICD-10-CM

## 2024-01-04 DIAGNOSIS — Z83.49 FAMILY HISTORY OF THYROID DISEASE: ICD-10-CM

## 2024-01-04 DIAGNOSIS — Z23 NEED FOR SHINGLES VACCINE: ICD-10-CM

## 2024-01-04 DIAGNOSIS — Z13.220 LIPID SCREENING: ICD-10-CM

## 2024-01-04 LAB
ERYTHROCYTE [DISTWIDTH] IN BLOOD BY AUTOMATED COUNT: 13.4 % (ref 10–15)
HCT VFR BLD AUTO: 40.6 % (ref 35–47)
HGB BLD-MCNC: 13.6 G/DL (ref 11.7–15.7)
MCH RBC QN AUTO: 31.4 PG (ref 26.5–33)
MCHC RBC AUTO-ENTMCNC: 33.5 G/DL (ref 31.5–36.5)
MCV RBC AUTO: 94 FL (ref 78–100)
PLATELET # BLD AUTO: 333 10E3/UL (ref 150–450)
RBC # BLD AUTO: 4.33 10E6/UL (ref 3.8–5.2)
WBC # BLD AUTO: 7.7 10E3/UL (ref 4–11)

## 2024-01-04 PROCEDURE — 80053 COMPREHEN METABOLIC PANEL: CPT | Performed by: PHYSICIAN ASSISTANT

## 2024-01-04 PROCEDURE — 85027 COMPLETE CBC AUTOMATED: CPT | Performed by: PHYSICIAN ASSISTANT

## 2024-01-04 PROCEDURE — 90471 IMMUNIZATION ADMIN: CPT | Performed by: PHYSICIAN ASSISTANT

## 2024-01-04 PROCEDURE — 84443 ASSAY THYROID STIM HORMONE: CPT | Performed by: PHYSICIAN ASSISTANT

## 2024-01-04 PROCEDURE — 90472 IMMUNIZATION ADMIN EACH ADD: CPT | Performed by: PHYSICIAN ASSISTANT

## 2024-01-04 PROCEDURE — 90750 HZV VACC RECOMBINANT IM: CPT | Performed by: PHYSICIAN ASSISTANT

## 2024-01-04 PROCEDURE — 99396 PREV VISIT EST AGE 40-64: CPT | Mod: 25 | Performed by: PHYSICIAN ASSISTANT

## 2024-01-04 PROCEDURE — 36415 COLL VENOUS BLD VENIPUNCTURE: CPT | Performed by: PHYSICIAN ASSISTANT

## 2024-01-04 PROCEDURE — 80061 LIPID PANEL: CPT | Performed by: PHYSICIAN ASSISTANT

## 2024-01-04 PROCEDURE — 90715 TDAP VACCINE 7 YRS/> IM: CPT | Performed by: PHYSICIAN ASSISTANT

## 2024-01-04 ASSESSMENT — ENCOUNTER SYMPTOMS
DYSURIA: 0
CONSTIPATION: 0
BREAST MASS: 0
ARTHRALGIAS: 1
PARESTHESIAS: 0
HEMATURIA: 0
DIZZINESS: 0
HEARTBURN: 0
EYE PAIN: 0
FREQUENCY: 0
NAUSEA: 0
CHILLS: 0
COUGH: 0
SORE THROAT: 0
PALPITATIONS: 0
DIARRHEA: 0
SHORTNESS OF BREATH: 0
FEVER: 0
MYALGIAS: 0
WEAKNESS: 0
ABDOMINAL PAIN: 0
JOINT SWELLING: 0
NERVOUS/ANXIOUS: 0
HEMATOCHEZIA: 0
HEADACHES: 0

## 2024-01-04 ASSESSMENT — PAIN SCALES - GENERAL: PAINLEVEL: MILD PAIN (3)

## 2024-01-04 NOTE — PROGRESS NOTES
SUBJECTIVE:   Christiana is a 58 year old, presenting for the following:  Physical        2024     2:17 PM   Additional Questions   Roomed by Will   Accompanied by N/A       Healthy Habits:     Getting at least 3 servings of Calcium per day:  Yes    Bi-annual eye exam:  Yes    Dental care twice a year:  Yes    Sleep apnea or symptoms of sleep apnea:  None    Diet:  Low salt, Low fat/cholesterol and Carbohydrate counting    Frequency of exercise:  4-5 days/week    Duration of exercise:  45-60 minutes    Taking medications regularly:  Yes    Medication side effects:  Not applicable    Additional concerns today:  No    Pap negative cotesting 2021, repeat 5 years   Mammo normal 2022, does not like to get yearly  Colonoscopy 2019, repeat 5 years (due 2024). History of colon cancer in  s/p right hemicolectomy     Social History     Tobacco Use    Smoking status: Never    Smokeless tobacco: Never   Substance Use Topics    Alcohol use: Yes     Comment: once a week           2024     1:12 PM   Alcohol Use   Prescreen: >3 drinks/day or >7 drinks/week? No     Reviewed orders with patient.  Reviewed health maintenance and updated orders accordingly - Yes      Breast Cancer Screenin/2/2022     8:19 AM   Breast CA Risk Assessment (FHS-7)   Do you have a family history of breast, colon, or ovarian cancer? No / Unknown         Mammogram Screening: Recommended mammography every 1-2 years with patient discussion and risk factor consideration  Pertinent mammograms are reviewed under the imaging tab.    History of abnormal Pap smear: NO - age 30-65 PAP every 5 years with negative HPV co-testing recommended      Latest Ref Rng & Units 2021     2:45 PM 2021     2:44 PM 2017    12:45 PM   PAP / HPV   PAP (Historical)   NIL     HPV 16 DNA NEG^Negative Negative   Negative    HPV 18 DNA NEG^Negative Negative   Negative    Other HR HPV NEG^Negative Negative   Negative      Reviewed and updated as  needed this visit by clinical staff   Tobacco  Allergies  Meds  Problems  Med Hx  Surg Hx  Fam Hx          Reviewed and updated as needed this visit by Provider   Tobacco  Allergies  Meds  Problems  Med Hx  Surg Hx  Fam Hx         Past Medical History:   Diagnosis Date    Abnormal uterine bleeding due to endometrial polyp 2022    Bipolar 1 disorder (H) 2017    pt states improper diagnosis.    Cancer (H)     colon    Degeneration of cervical intervertebral disc     History of colon cancer, no staging 2013    History of sleep apnea 2013    Ella (H) 2017    ALPHONSO (obstructive sleep apnea) 2013    Status post tonsillectomy 2013    Status post uvulopalatopharyngoplasty 2013      Past Surgical History:   Procedure Laterality Date    APPENDECTOMY      During cancer surgery     SECTION      ENT SURGERY      tonsils and uvula removed    HC HYSTEROSCOPY W ENDOMETRIAL BX/POLYPECTOMY W/WO D&C  2022    HEMICOLECTOMY, RT/LT      right    LASIK      LIPOSUCTION (LOCATION)      MAMMOPLASTY AUGMENTATION      TUBAL LIGATION         Review of Systems   Constitutional:  Negative for chills and fever.   HENT:  Negative for congestion, ear pain, hearing loss and sore throat.    Eyes:  Negative for pain and visual disturbance.   Respiratory:  Negative for cough and shortness of breath.    Cardiovascular:  Negative for chest pain, palpitations and peripheral edema.   Gastrointestinal:  Negative for abdominal pain, constipation, diarrhea, heartburn, hematochezia and nausea.   Breasts:  Negative for tenderness, breast mass and discharge.   Genitourinary:  Negative for dysuria, frequency, genital sores, hematuria, pelvic pain, urgency, vaginal bleeding and vaginal discharge.   Musculoskeletal:  Positive for arthralgias. Negative for joint swelling and myalgias.   Skin:  Negative for rash.   Neurological:  Negative for dizziness, weakness, headaches and paresthesias.  "  Psychiatric/Behavioral:  Negative for mood changes. The patient is not nervous/anxious.         OBJECTIVE:   /74   Pulse 60   Temp 97.1  F (36.2  C) (Tympanic)   Resp 16   Ht 1.84 m (6' 0.44\")   Wt 96.8 kg (213 lb 6.4 oz)   SpO2 100%   BMI 28.59 kg/m    Physical Exam  GENERAL: healthy, alert and no distress  EYES: Eyes grossly normal to inspection, PERRL and conjunctivae and sclerae normal  HENT: ear canals and TM's normal, nose and mouth without ulcers or lesions  NECK: no adenopathy, no asymmetry, masses, or scars and thyroid normal to palpation  RESP: lungs clear to auscultation - no rales, rhonchi or wheezes  BREAST: declines   CV: regular rate and rhythm, normal S1 S2, no S3 or S4, no murmur, click or rub, no peripheral edema and peripheral pulses strong  ABDOMEN: soft, nontender, no hepatosplenomegaly, no masses and bowel sounds normal  MS: no gross musculoskeletal defects noted, no edema  SKIN: no suspicious lesions or rashes  NEURO: Normal strength and tone, mentation intact and speech normal  PSYCH: mentation appears normal, affect normal/bright    Diagnostic Test Results:  See orders, pending     ASSESSMENT/PLAN:   Routine general medical examination at a health care facility  Mammogram and colonoscopy in 8-2024  - Lipid panel reflex to direct LDL Non-fasting  - Comprehensive metabolic panel (BMP + Alb, Alk Phos, ALT, AST, Total. Bili, TP)  - CBC with platelets  - TSH with free T4 reflex    Lipid screening  - Lipid panel reflex to direct LDL Non-fasting    Family history of thyroid disease  - TSH with free T4 reflex    Need for shingles vaccine  - ZOSTER RECOMBINANT ADJUVANTED (SHINGRIX)    Need for Tdap vaccination  - TDAP 10-64Y (ADACEL,BOOSTRIX)     COUNSELING:  Reviewed preventive health counseling, as reflected in patient instructions      BMI:   Estimated body mass index is 28.59 kg/m  as calculated from the following:    Height as of this encounter: 1.84 m (6' 0.44\").    Weight as of " this encounter: 96.8 kg (213 lb 6.4 oz).   Weight management plan: Discussed healthy diet and exercise guidelines      She reports that she has never smoked. She has never used smokeless tobacco.    Leslie Shipman PA-C on 1/4/2024 at 2:42 PM    [facility]

## 2024-01-04 NOTE — PATIENT INSTRUCTIONS
Colonoscopy and mammogram in August 2024    Try voltaren gel for hips     Leslie Shipman PA-C  [address] [address]  Phone: [phone]  Fax: [phone]       Preventive Health Recommendations  Female Ages 50 - 64    Yearly exam: See your health care provider every year in order to  Review health changes.   Discuss preventive care.    Review your medicines if your doctor has prescribed any.    Get a Pap test every three years (unless you have an abnormal result and your provider advises testing more often).  If you get Pap tests with HPV test, you only need to test every 5 years, unless you have an abnormal result.   You do not need a Pap test if your uterus was removed (hysterectomy) and you have not had cancer.  You should be tested each year for STDs (sexually transmitted diseases) if you're at risk.   Have a mammogram every 1 to 2 years.  Have a colonoscopy at age 45, or have a yearly FIT test (stool test). These exams screen for colon cancer.    Have a cholesterol test every 5 years, or more often if advised.  Have a diabetes test (fasting glucose) every three years. If you are at risk for diabetes, you should have this test more often.   If you are at risk for osteoporosis (brittle bone disease), think about having a bone density scan (DEXA).    Shots: Get a flu shot each year. Get a tetanus shot every 10 years.    Nutrition:   Eat at least 5 servings of fruits and vegetables each day.  Eat whole-grain bread, whole-wheat pasta and brown rice instead of white grains and rice.  Get adequate Calcium and Vitamin D.     Lifestyle  Exercise at least 150 minutes a week (30 minutes a day, 5 days a week). This will help you control your weight and prevent disease.  Limit alcohol to one drink per day.  No smoking.   Wear sunscreen to prevent skin cancer.   See your dentist every six months for an exam and cleaning.  See your eye doctor every 1  to 2 years.

## 2024-01-05 LAB
ALBUMIN SERPL BCG-MCNC: 4.3 G/DL (ref 3.5–5.2)
ALP SERPL-CCNC: 70 U/L (ref 40–150)
ALT SERPL W P-5'-P-CCNC: 25 U/L (ref 0–50)
ANION GAP SERPL CALCULATED.3IONS-SCNC: 10 MMOL/L (ref 7–15)
AST SERPL W P-5'-P-CCNC: 25 U/L (ref 0–45)
BILIRUB SERPL-MCNC: 0.3 MG/DL
BUN SERPL-MCNC: 21.9 MG/DL (ref 8–23)
CALCIUM SERPL-MCNC: 9.6 MG/DL (ref 8.6–10)
CHLORIDE SERPL-SCNC: 102 MMOL/L (ref 98–107)
CHOLEST SERPL-MCNC: 226 MG/DL
CREAT SERPL-MCNC: 0.87 MG/DL (ref 0.51–0.95)
DEPRECATED HCO3 PLAS-SCNC: 27 MMOL/L (ref 22–29)
EGFRCR SERPLBLD CKD-EPI 2021: 76 ML/MIN/1.73M2
FASTING STATUS PATIENT QL REPORTED: NO
GLUCOSE SERPL-MCNC: 92 MG/DL (ref 70–99)
HDLC SERPL-MCNC: 37 MG/DL
LDLC SERPL CALC-MCNC: 134 MG/DL
NONHDLC SERPL-MCNC: 189 MG/DL
POTASSIUM SERPL-SCNC: 4.5 MMOL/L (ref 3.4–5.3)
PROT SERPL-MCNC: 7.3 G/DL (ref 6.4–8.3)
SODIUM SERPL-SCNC: 139 MMOL/L (ref 135–145)
TRIGL SERPL-MCNC: 277 MG/DL
TSH SERPL DL<=0.005 MIU/L-ACNC: 2.83 UIU/ML (ref 0.3–4.2)

## 2024-01-05 NOTE — RESULT ENCOUNTER NOTE
Christiana,    I have reviewed your lab results below:    - cholesterol is elevated but the American Heart Association does not recommend a cholesterol medication at this time, based on your cholesterol levels and other risk factors. Recommend you continue to work on your excellent lifestyle efforts and recheck in 1 year.  - electrolytes, blood sugar, kidney and liver function normal   - thyroid function is within normal limits  - blood counts are normal - normal hemoglobin/red blood cells (no anemia), normal white blood cells (infection fighting cells), normal platelets (affect ability to clot normally)      Please let me know if you have any further questions.    Take care,  Leslie Shipman PA-C on 1/5/2024 at 12:41 PM

## 2024-02-22 ENCOUNTER — MYC MEDICAL ADVICE (OUTPATIENT)
Dept: FAMILY MEDICINE | Facility: CLINIC | Age: 59
End: 2024-02-22
Payer: COMMERCIAL

## 2024-02-22 DIAGNOSIS — N95.1 HOT FLASHES, MENOPAUSAL: Primary | ICD-10-CM

## 2024-02-27 ENCOUNTER — PATIENT OUTREACH (OUTPATIENT)
Dept: GASTROENTEROLOGY | Facility: CLINIC | Age: 59
End: 2024-02-27
Payer: COMMERCIAL

## 2024-03-06 NOTE — TELEPHONE ENCOUNTER
Prescription sent for veozah 45 mg once daily - 90 day supply sent  Will need LFTs monitored at 3, 6 and 9 months  Orders placed and lab only follow up ticket entered for 3 months from now.   Leslie Shipman PA-C on 3/6/2024 at 1:18 PM

## 2024-05-28 ENCOUNTER — PATIENT OUTREACH (OUTPATIENT)
Dept: GASTROENTEROLOGY | Facility: CLINIC | Age: 59
End: 2024-05-28
Payer: COMMERCIAL

## 2024-05-28 DIAGNOSIS — Z12.11 SPECIAL SCREENING FOR MALIGNANT NEOPLASMS, COLON: Primary | ICD-10-CM

## 2024-05-28 NOTE — PROGRESS NOTES
"CRC Screening Colonoscopy Referral Review    Patient meets the inclusion criteria for screening colonoscopy standing order.    Ordering/Referring Provider:  Leslie Shipman     BMI: Estimated body mass index is 28.59 kg/m  as calculated from the following:    Height as of 1/4/24: 1.84 m (6' 0.44\").    Weight as of 1/4/24: 96.8 kg (213 lb 6.4 oz).     Sedation:  Does patient have any of the following conditions affecting sedation?  No medical conditions affecting sedation.    Previous Scopes:  Any previous recommendations or follow up needs based on previous scope?  na / No recommendations.    Medical Concerns to Postpone Order:  Does patient have any of the following medical concerns that should postpone/delay colonoscopy referral?  No medical conditions affecting colonoscopy referral.    Final Referral Details:  Based on patient's medical history patient is appropriate for referral order with moderate sedation. If patient's BMI > 50 do not schedule in [facility].  "

## 2024-06-11 ENCOUNTER — TELEPHONE (OUTPATIENT)
Dept: GASTROENTEROLOGY | Facility: CLINIC | Age: 59
End: 2024-06-11
Payer: COMMERCIAL

## 2024-06-11 NOTE — TELEPHONE ENCOUNTER
"Endoscopy Scheduling Screen    Have you had a positive Covid test in the last 14 days?  No    What is your communication preference for Instructions and/or Bowel Prep?   MyChart    What insurance is in the chart?  Other:  med[employer]    Ordering/Referring Provider:     MELVIN REED      (If ordering provider performs procedure, schedule with ordering provider unless otherwise instructed. )    BMI: Estimated body mass index is 28.59 kg/m  as calculated from the following:    Height as of 1/4/24: 1.84 m (6' 0.44\").    Weight as of 1/4/24: 96.8 kg (213 lb 6.4 oz).     Sedation Ordered  moderate sedation.   If patient BMI > 50 do not schedule in [facility].    If patient BMI > 45 do not schedule at [facility].    Are you taking methadone or Suboxone?  No    Have you had difficulties, pain, or discomfort during past endoscopy procedures?  No    Are you taking any prescription medications for pain 3 or more times per week?   NO, No RN review required.    Do you have a history of malignant hyperthermia?  No    (Females) Are you currently pregnant?   No     Have you been diagnosed or told you have pulmonary hypertension?   No    Do you have an LVAD?  No    Have you been told you have moderate to severe sleep apnea?  Yes (RN Review required for scheduling unless scheduling in Hospital.)    Have you been told you have COPD, asthma, or any other lung disease?  No    Do you have any heart conditions?  Yes     In the past year, have you had any hospitalizations for heart related issues including cardiomyopathy, heart attack, or stent placement?  No    Do you have any implantable devices in your body (pacemaker, ICD)?  No    Do you take nitroglycerine?  No    Have you ever had or are you waiting for an organ transplant?  No. Continue scheduling, no site restrictions.    Have you had a stroke or transient ischemic attack (TIA aka \"mini stroke\" in the last 6 months?   No    Have you been diagnosed with or been told you have cirrhosis of the " "liver?   No    Are you currently on dialysis?   No    Do you need assistance transferring?   No    BMI: Estimated body mass index is 28.59 kg/m  as calculated from the following:    Height as of 1/4/24: 1.84 m (6' 0.44\").    Weight as of 1/4/24: 96.8 kg (213 lb 6.4 oz).     Is patients BMI > 40 and scheduling location [facility]?  No    Do you take an injectable medication for weight loss or diabetes (excluding insulin)?  No    Do you take the medication Naltrexone?  No    Do you take blood thinners?  No       Prep   Are you currently on dialysis or do you have chronic kidney disease?  No    Do you have a diagnosis of diabetes?  No    Do you have a diagnosis of cystic fibrosis (CF)?  No    On a regular basis do you go 3 -5 days between bowel movements?  No    BMI > 40?  No    Preferred Pharmacy:    [id number]  [place], [place] - [address] [facility] [place] [facility] [address]  [address] [facility]  [place] [address]  Phone: [phone] Fax: [phone]      Final Scheduling Details     Procedure scheduled  Colonoscopy    Surgeon:  Narayan     Date of procedure:  10/4     Pre-OP / PAC:   No - Not required for this site.    Location  [facility] - Per order.    Sedation   Moderate Sedation - Per order.      Patient Reminders:   You will receive a call from a Nurse to review instructions and health history.  This assessment must be completed prior to your procedure.  Failure to complete the Nurse assessment may result in the procedure being cancelled.      On the day of your procedure, please designate an adult(s) who can drive you home stay with you for the next 24 hours. The medicines used in the exam will make you sleepy. You will not be able to drive.      You cannot take public transportation, ride share services, or non-medical taxi service without a responsible caregiver.  Medical transport services are allowed with the requirement that a responsible caregiver will receive you at your destination.  We " require that drivers and caregivers are confirmed prior to your procedure.

## 2024-07-18 ENCOUNTER — PATIENT OUTREACH (OUTPATIENT)
Dept: CARE COORDINATION | Facility: CLINIC | Age: 59
End: 2024-07-18
Payer: COMMERCIAL

## 2024-08-14 ENCOUNTER — TELEPHONE (OUTPATIENT)
Dept: FAMILY MEDICINE | Facility: CLINIC | Age: 59
End: 2024-08-14
Payer: COMMERCIAL

## 2024-08-15 ENCOUNTER — PATIENT OUTREACH (OUTPATIENT)
Dept: CARE COORDINATION | Facility: CLINIC | Age: 59
End: 2024-08-15
Payer: COMMERCIAL

## 2024-09-11 DIAGNOSIS — N95.1 HOT FLASHES, MENOPAUSAL: ICD-10-CM

## 2024-09-11 RX ORDER — FEZOLINETANT 45 MG/1
1 TABLET, FILM COATED ORAL DAILY
Qty: 30 TABLET | Refills: 2 | OUTPATIENT
Start: 2024-09-11

## 2024-09-11 NOTE — TELEPHONE ENCOUNTER
Needs LFTs rechecked prior to refills. Lab orders in  Leslie Shipman PA-C on 9/11/2024 at 7:00 AM

## 2024-09-13 ENCOUNTER — LAB (OUTPATIENT)
Dept: LAB | Facility: CLINIC | Age: 59
End: 2024-09-13
Payer: COMMERCIAL

## 2024-09-13 DIAGNOSIS — N95.1 HOT FLASHES, MENOPAUSAL: ICD-10-CM

## 2024-09-13 LAB
ALBUMIN SERPL BCG-MCNC: 4.5 G/DL (ref 3.5–5.2)
ALP SERPL-CCNC: 73 U/L (ref 40–150)
ALT SERPL W P-5'-P-CCNC: 34 U/L (ref 0–50)
AST SERPL W P-5'-P-CCNC: 28 U/L (ref 0–45)
BILIRUB DIRECT SERPL-MCNC: <0.2 MG/DL (ref 0–0.3)
BILIRUB SERPL-MCNC: 0.3 MG/DL
PROT SERPL-MCNC: 7.5 G/DL (ref 6.4–8.3)

## 2024-09-13 PROCEDURE — 80076 HEPATIC FUNCTION PANEL: CPT

## 2024-09-13 PROCEDURE — 36415 COLL VENOUS BLD VENIPUNCTURE: CPT

## 2024-09-16 NOTE — RESULT ENCOUNTER NOTE
Christiana,    Liver tests are normal. Please let me know if you have any further questions.    Take care,  Leslie Shipman PA-C on 9/16/2024 at 6:59 AM

## 2024-09-24 ENCOUNTER — TELEPHONE (OUTPATIENT)
Dept: GASTROENTEROLOGY | Facility: CLINIC | Age: 59
End: 2024-09-24
Payer: COMMERCIAL

## 2024-09-24 NOTE — TELEPHONE ENCOUNTER
Pre visit planning completed.      Procedure details:    Patient scheduled for Colonoscopy on 10/4/24.     Arrival time: 0715. Procedure time 0800    Facility location: [facility]; [address] [place] [address] [place] [address]. Check in location: 1st Floor [facility].     Sedation type: Conscious sedation     Pre op exam needed? No.    Indication for procedure: screening, hx colon polyps      Chart review:     Electronic implanted devices? No    Recent diagnosis of diverticulitis within the last 6 weeks? No      Medication review:    Diabetic? No    Anticoagulants? No    Weight loss medication/injectable? No.    Other medication HOLDING recommendations:  N/A      Prep for procedure:     Bowel prep recommendation: Standard Miralax  Due to: standard bowel prep.    Prep instructions sent via [employer] [facility] 9/12/24         Emely Joe RN  Endoscopy Procedure Pre Assessment RN  [phone] option 2

## 2024-09-24 NOTE — TELEPHONE ENCOUNTER
Attempted to contact patient in order to complete pre assessment questions.     No answer. Left message to return call to [phone] option 2    Callback required communication sent via [employer].      Zulma Emerson RN  Endoscopy Procedure Pre Assessment

## 2024-10-01 NOTE — TELEPHONE ENCOUNTER
Pre assessment completed for upcoming procedure.   (Please see previous telephone encounter notes for complete details)    Procedure details:    Arrival time and facility location reviewed.    Pre op exam needed? No.    Designated  policy reviewed. Instructed to have someone stay 6 hours post procedure.     COVID policy reviewed.      Medication review:    Medications reviewed. Please see supporting documentation below. Holding recommendations discussed (if applicable).       Prep for procedure:     Procedure prep instructions reviewed.        Additional information needed?  N/A      Patient  verbalized understanding and had no questions or concerns at this time.      Corinne Kliber, RN  Endoscopy Procedure Pre Assessment   [phone] option 4

## 2024-10-04 ENCOUNTER — HOSPITAL ENCOUNTER (OUTPATIENT)
Facility: CLINIC | Age: 59
Discharge: HOME | End: 2024-10-04
Attending: INTERNAL MEDICINE | Admitting: INTERNAL MEDICINE
Payer: COMMERCIAL

## 2024-10-04 VITALS
HEART RATE: 62 BPM | HEIGHT: 72 IN | SYSTOLIC BLOOD PRESSURE: 96 MMHG | RESPIRATION RATE: 9 BRPM | DIASTOLIC BLOOD PRESSURE: 71 MMHG | OXYGEN SATURATION: 95 % | BODY MASS INDEX: 30.48 KG/M2 | WEIGHT: 225 LBS

## 2024-10-04 LAB — COLONOSCOPY: NORMAL

## 2024-10-04 PROCEDURE — 88305 TISSUE EXAM BY PATHOLOGIST: CPT | Mod: 26 | Performed by: PATHOLOGY

## 2024-10-04 PROCEDURE — 250N000011 HC RX IP 250 OP 636: Performed by: INTERNAL MEDICINE

## 2024-10-04 PROCEDURE — 45380 COLONOSCOPY AND BIOPSY: CPT | Performed by: INTERNAL MEDICINE

## 2024-10-04 PROCEDURE — G0500 MOD SEDAT ENDO SERVICE >5YRS: HCPCS | Performed by: INTERNAL MEDICINE

## 2024-10-04 PROCEDURE — 88305 TISSUE EXAM BY PATHOLOGIST: CPT | Mod: TC | Performed by: INTERNAL MEDICINE

## 2024-10-04 RX ORDER — FENTANYL CITRATE 50 UG/ML
INJECTION, SOLUTION INTRAMUSCULAR; INTRAVENOUS PRN
Status: DISCONTINUED | OUTPATIENT
Start: 2024-10-04 | End: 2024-10-04 | Stop reason: HOSPADM

## 2024-10-04 ASSESSMENT — ACTIVITIES OF DAILY LIVING (ADL)
ADLS_ACUITY_SCORE: 35
ADLS_ACUITY_SCORE: 35

## 2024-11-10 ENCOUNTER — HEALTH MAINTENANCE LETTER (OUTPATIENT)
Age: 59
End: 2024-11-10

## 2024-12-05 ENCOUNTER — PATIENT OUTREACH (OUTPATIENT)
Dept: CARE COORDINATION | Facility: CLINIC | Age: 59
End: 2024-12-05
Payer: COMMERCIAL

## 2024-12-19 ENCOUNTER — PATIENT OUTREACH (OUTPATIENT)
Dept: CARE COORDINATION | Facility: CLINIC | Age: 59
End: 2024-12-19
Payer: COMMERCIAL

## 2025-02-13 ENCOUNTER — PATIENT OUTREACH (OUTPATIENT)
Dept: CARE COORDINATION | Facility: CLINIC | Age: 60
End: 2025-02-13
Payer: COMMERCIAL

## 2025-03-02 ENCOUNTER — HEALTH MAINTENANCE LETTER (OUTPATIENT)
Age: 60
End: 2025-03-02

## 2025-05-27 ENCOUNTER — PATIENT OUTREACH (OUTPATIENT)
Dept: GASTROENTEROLOGY | Facility: CLINIC | Age: 60
End: 2025-05-27
Payer: COMMERCIAL

## (undated) RX ORDER — FENTANYL CITRATE 50 UG/ML
INJECTION, SOLUTION INTRAMUSCULAR; INTRAVENOUS
Status: DISPENSED
Start: 2024-10-04